# Patient Record
Sex: MALE | Race: WHITE
[De-identification: names, ages, dates, MRNs, and addresses within clinical notes are randomized per-mention and may not be internally consistent; named-entity substitution may affect disease eponyms.]

---

## 2019-04-17 ENCOUNTER — HOSPITAL ENCOUNTER (INPATIENT)
Dept: HOSPITAL 33 - ED | Age: 49
LOS: 9 days | Discharge: ANOTHER HOSPITAL | DRG: 872 | End: 2019-04-26
Attending: GENERAL PRACTICE | Admitting: GENERAL PRACTICE
Payer: COMMERCIAL

## 2019-04-17 DIAGNOSIS — E11.621: ICD-10-CM

## 2019-04-17 DIAGNOSIS — R94.4: ICD-10-CM

## 2019-04-17 DIAGNOSIS — M86.171: ICD-10-CM

## 2019-04-17 DIAGNOSIS — D72.829: ICD-10-CM

## 2019-04-17 DIAGNOSIS — A41.9: Primary | ICD-10-CM

## 2019-04-17 DIAGNOSIS — L02.611: ICD-10-CM

## 2019-04-17 DIAGNOSIS — E11.610: ICD-10-CM

## 2019-04-17 DIAGNOSIS — R60.9: ICD-10-CM

## 2019-04-17 DIAGNOSIS — L97.519: ICD-10-CM

## 2019-04-17 DIAGNOSIS — I10: ICD-10-CM

## 2019-04-17 DIAGNOSIS — N17.9: ICD-10-CM

## 2019-04-17 LAB
ALBUMIN SERPL-MCNC: 3.1 G/DL (ref 3.5–5)
ALP SERPL-CCNC: 119 U/L (ref 38–126)
ALT SERPL-CCNC: 20 U/L (ref 0–50)
ANION GAP SERPL CALC-SCNC: 14.2 MEQ/L (ref 5–15)
AST SERPL QL: 22 U/L (ref 17–59)
BILIRUB BLD-MCNC: 1.1 MG/DL (ref 0.2–1.3)
BUN SERPL-MCNC: 37 MG/DL (ref 9–20)
CALCIUM SPEC-MCNC: 8.7 MG/DL (ref 8.4–10.2)
CHLORIDE SERPL-SCNC: 108 MMOL/L (ref 98–107)
CO2 SERPL-SCNC: 17 MMOL/L (ref 22–30)
CREAT SERPL-MCNC: 2.59 MG/DL (ref 0.66–1.25)
GLUCOSE SERPL-MCNC: 242 MG/DL (ref 74–106)
POTASSIUM SERPLBLD-SCNC: 4.2 MMOL/L (ref 3.5–5.1)
PROT SERPL-MCNC: 7 G/DL (ref 6.3–8.2)
SODIUM SERPL-SCNC: 135 MMOL/L (ref 137–145)

## 2019-04-17 PROCEDURE — 76937 US GUIDE VASCULAR ACCESS: CPT

## 2019-04-17 PROCEDURE — 96367 TX/PROPH/DG ADDL SEQ IV INF: CPT

## 2019-04-17 PROCEDURE — 77001 FLUOROGUIDE FOR VEIN DEVICE: CPT

## 2019-04-17 PROCEDURE — 96374 THER/PROPH/DIAG INJ IV PUSH: CPT

## 2019-04-17 PROCEDURE — 86334 IMMUNOFIX E-PHORESIS SERUM: CPT

## 2019-04-17 PROCEDURE — 86255 FLUORESCENT ANTIBODY SCREEN: CPT

## 2019-04-17 PROCEDURE — 73718 MRI LOWER EXTREMITY W/O DYE: CPT

## 2019-04-17 PROCEDURE — 93268 ECG RECORD/REVIEW: CPT

## 2019-04-17 PROCEDURE — 82570 ASSAY OF URINE CREATININE: CPT

## 2019-04-17 PROCEDURE — 85610 PROTHROMBIN TIME: CPT

## 2019-04-17 PROCEDURE — 86160 COMPLEMENT ANTIGEN: CPT

## 2019-04-17 PROCEDURE — 99285 EMERGENCY DEPT VISIT HI MDM: CPT

## 2019-04-17 PROCEDURE — 83520 IMMUNOASSAY QUANT NOS NONAB: CPT

## 2019-04-17 PROCEDURE — 93306 TTE W/DOPPLER COMPLETE: CPT

## 2019-04-17 PROCEDURE — 76770 US EXAM ABDO BACK WALL COMP: CPT

## 2019-04-17 PROCEDURE — 82962 GLUCOSE BLOOD TEST: CPT

## 2019-04-17 PROCEDURE — 80202 ASSAY OF VANCOMYCIN: CPT

## 2019-04-17 PROCEDURE — 85025 COMPLETE CBC W/AUTO DIFF WBC: CPT

## 2019-04-17 PROCEDURE — 87186 SC STD MICRODIL/AGAR DIL: CPT

## 2019-04-17 PROCEDURE — 87086 URINE CULTURE/COLONY COUNT: CPT

## 2019-04-17 PROCEDURE — 83605 ASSAY OF LACTIC ACID: CPT

## 2019-04-17 PROCEDURE — 36415 COLL VENOUS BLD VENIPUNCTURE: CPT

## 2019-04-17 PROCEDURE — 85730 THROMBOPLASTIN TIME PARTIAL: CPT

## 2019-04-17 PROCEDURE — 80053 COMPREHEN METABOLIC PANEL: CPT

## 2019-04-17 PROCEDURE — 84156 ASSAY OF PROTEIN URINE: CPT

## 2019-04-17 PROCEDURE — 84165 PROTEIN E-PHORESIS SERUM: CPT

## 2019-04-17 PROCEDURE — 87205 SMEAR GRAM STAIN: CPT

## 2019-04-17 PROCEDURE — 36000 PLACE NEEDLE IN VEIN: CPT

## 2019-04-17 PROCEDURE — 87077 CULTURE AEROBIC IDENTIFY: CPT

## 2019-04-17 PROCEDURE — 86038 ANTINUCLEAR ANTIBODIES: CPT

## 2019-04-17 PROCEDURE — 87040 BLOOD CULTURE FOR BACTERIA: CPT

## 2019-04-17 PROCEDURE — 36569 INSJ PICC 5 YR+ W/O IMAGING: CPT

## 2019-04-17 PROCEDURE — 96365 THER/PROPH/DIAG IV INF INIT: CPT

## 2019-04-17 PROCEDURE — 96375 TX/PRO/DX INJ NEW DRUG ADDON: CPT

## 2019-04-17 PROCEDURE — 71045 X-RAY EXAM CHEST 1 VIEW: CPT

## 2019-04-17 PROCEDURE — 87070 CULTURE OTHR SPECIMN AEROBIC: CPT

## 2019-04-17 PROCEDURE — 83036 HEMOGLOBIN GLYCOSYLATED A1C: CPT

## 2019-04-17 PROCEDURE — 73630 X-RAY EXAM OF FOOT: CPT

## 2019-04-17 PROCEDURE — 86256 FLUORESCENT ANTIBODY TITER: CPT

## 2019-04-17 PROCEDURE — 81001 URINALYSIS AUTO W/SCOPE: CPT

## 2019-04-17 PROCEDURE — 80048 BASIC METABOLIC PNL TOTAL CA: CPT

## 2019-04-17 PROCEDURE — G0378 HOSPITAL OBSERVATION PER HR: HCPCS

## 2019-04-17 PROCEDURE — 83735 ASSAY OF MAGNESIUM: CPT

## 2019-04-17 PROCEDURE — 96360 HYDRATION IV INFUSION INIT: CPT

## 2019-04-17 RX ADMIN — GABAPENTIN PRN MG: 300 CAPSULE ORAL at 22:07

## 2019-04-17 RX ADMIN — CLONIDINE HYDROCHLORIDE SCH MG: 0.1 TABLET ORAL at 21:06

## 2019-04-17 RX ADMIN — INSULIN ASPART PRN UNIT: 100 INJECTION, SOLUTION INTRAVENOUS; SUBCUTANEOUS at 22:02

## 2019-04-17 NOTE — XRAY
Indication: Possible sepsis.



Comparison: August 12, 2010.



Portable apical lordotic chest is clear again with a few incidental tiny

calcified granulomas.  Heart and mediastinal structures within normal limits

for AP portable technique again with paratracheal calcified node.  Bony thorax

intact again with mild degenerative changes.



Impression: Nonacute chest with chronic features.

## 2019-04-17 NOTE — ERPHSYRPT
- History of Present Illness


Time Seen by Provider: 04/17/19 16:20


Source: patient


Exam Limitations: no limitations


Patient Subjective Stated Complaint: Pt states "I have been feeling bad with 

body aches and went to [facility] and they vidal blood and said I had a high 

white count and to get into the ED."


Triage Nursing Assessment: PT alert and oriented X 3, skin pwd PT ambulates 

with a limp.  Pt slightly tachypniec. PT has walking boot on his right foot.  

PT has wound noted to right foot.


Physician History: 





patient with fever and chills- seen in OP clinic- elevated wBC and neg flu sent 

to ED fro evaluation- hx of Charcot foot -right- has sore x 3 weeks- drainage; 

no recent trauma or travel; no sore throat; cough productive brown; no sob; no N

&V; no diarrhea; no known exposures


Timing/Duration: today, yesterday (onset), worse


Fever Severity: moderate


Fever Therapy PTA: Ibuprofen


Associated Symptoms: cough, diaphoresis, headache, muscle aches, No nausea/

vomiting, No rhinorrhea, No shortness of breath, No sore throat, No stiff neck, 

No syncope, No weakness


International travel in last 2 weeks: No


Allergies/Adverse Reactions: 








No Known Drug Allergies Allergy (Verified 04/17/19 16:30)


 





Home Medications: 








Amlodipine Besylate 10 mg PO DAILY 04/17/19 [History]


Fenofibrate Nanocrystallized [Fenofibrate] 48 mg PO DAILY 04/17/19 [History]


Gabapentin 600 mg PO DAILY 04/17/19 [History]


Insulin Glargine,Hum.rec.anlog [Lantus Solostar] 100 units IM DAILY 04/17/19 [

History]


Lisinopril 40 mg PO DAILY 04/17/19 [History]


cloNIDine HCl [Clonidine HCl] 0.1 mg PO DAILY 04/17/19 [History]





Hx Tetanus, Diphtheria Vaccination/Date Given: Yes


Hx Influenza Vaccination/Date Given: Yes


Hx Pneumococcal Vaccination/Date Given: No


Immunizations Up to Date: Yes





- Review of Systems


Constitutional: Chills


Eyes: No Symptoms


Ears, Nose, & Throat: No Symptoms


Respiratory: Cough, No Dyspnea, No Wheezing


Cardiac: No Chest Pain, No Palpitations, No Syncope


Abdominal/Gastrointestinal: No Abdominal Pain, No Nausea, No Vomiting, No 

Diarrhea


Genitourinary Symptoms: No Dysuria, No Frequency, No Hematuria, No Hesitancy


Musculoskeletal: Myalgias, No Back Pain, No Fall, No Injury, No Joint Pain


Skin: Other (open ulcer bottom lateral left foot)


Neurological: Headache, No Dizziness, No Focal Weakness, No Seizure, No Vertigo


Psychological: No Symptoms


Endocrine: No Symptoms


Hematologic/Lymphatic: No Symptoms


Immunological/Allergic: No Symptoms





- Past Medical History


Pertinent Past Medical History: Yes


Neurological History: No Pertinent History


ENT History: No Pertinent History


Cardiac History: Hypertension


Respiratory History: Pneumonia


Endocrine Medical History: Diabetes Type II


Musculoskeletal History: Other


GI Medical History: No Pertinent History


 History: No Pertinent History


Psycho-Social History: No Pertinent History


Male Reproductive Disorders: No Pertinent History





- Past Surgical History


Past Surgical History: No


Neuro Surgical History: No Pertinent History


Cardiac: No Pertinent History


Respiratory: No Pertinent History


Gastrointestinal: No Pertinent History


Genitourinary: No Pertinent History


Musculoskeletal: No Pertinent History


Male Surgical History: No Pertinent History





- Social History


Smoking Status: Never smoker


Exposure to second hand smoke: Yes


Alcohol Use: None


Drug Use: none


Patient Lives Alone: No


Significant Family History: no pertinent family hx





- Nursing Vital Signs


Nursing Vital Signs: 


 Initial Vital Signs











Temperature  101.8 F   04/17/19 16:21


 


Pulse Rate  114 H  04/17/19 16:21


 


Respiratory Rate  22   04/17/19 16:21


 


Blood Pressure  179/88   04/17/19 16:21


 


O2 Sat by Pulse Oximetry  94 L  04/17/19 16:21








 Pain Scale











Pain Intensity                 6

















- Physical Exam


General Appearance: moderate distress, alert, obese, other (ill appearing)


Eye Exam: PERRL/EOMI, eyes nml inspection, No photophobia


ENT Exam: normal ENT inspection, no apparent trauma, hearing grossly normal, 

TMs normal, pharynx normal, airway intact, No nasal congestion, No nasal 

drainage, No pharyngeal erythema, No tonsillar exudate, No trismus


Neck Exam: normal inspection, non-tender, supple, full range of motion, trachea 

midline, No JVD


Respiratory Exam: lungs clear, no respiratory distress, no accessory muscle use

, decreased breath sounds, decreased air movement, No normal breath sounds, No 

chest non-tender, No respiratory distress, No crackles/rales, No rhonchi, No 

stridor, No wheezing, No pleural rub


Cardiovascular/Chest Exam: normal heart sounds, regular rate/rhythm, normal 

peripheral pulses, tachycardia (114), No edema, No JVD


Gastrointestinal/Abdominal Exam: soft, non tender, no distention, no guarding, 

no organomegaly, normal bowel sounds, No distended, No guarding, No rebound, No 

hepatomegaly


Rectal Exam: deferred


Extremity Exam: non-tender, normal range of motion, normal capillary refill, no 

calf tenderness, no pedal edema, No normal inspection (left foot changes of 

Charcot foot with open ulcer bottom left heel- culture take), No calf tenderness

, No erica's sign


Neurologic Exam: alert, oriented x 3, cooperative, CNs II-XII nml as tested, 

normal mood/affect, nml cerebellar function, nml station & gait


Skin Exam: normal color, warm, dry, decubitus (post bottom left foot), No rash, 

No petechiae, No cyanosis


Lymphatic: No adenopathy


**SpO2 Interpretation**: borderline oxygenation


SpO2: 94


O2 Delivery: Room Air





- Course


Nursing assessment & vital signs reviewed: Yes





- Radiology Exams


  ** Chest


X-ray Interpretation: Reviewed by me, Teleradiologist Report, Negative, Other (

chronic changes only)


Ordered Tests: 


 Active Orders 24 hr











 Category Date Time Status


 


 Bedrest with BRP/BSC ROUTINE Activity  04/17/19 17:44 Ordered


 


 Accucheck ACHS Care  04/17/19 17:42 Ordered


 


 Accucheck STAT Care  04/17/19 16:28 Active


 


 Call Admit Doctor for Orders ON ADMISSION Care  04/17/19 17:43 Ordered


 


 Code Status Order ROUTINE Care  04/17/19 17:42 Ordered


 


 IV Care Q6H Care  04/17/19 17:42 Ordered


 


 IV Insertion STAT Care  04/17/19 16:28 Active


 


 Place in Observation ROUTINE Care  04/17/19 17:42 Ordered


 


 Pulse Oximetry (ED) STAT Care  04/17/19 16:28 Active


 


 Rectal Temperature STAT Care  04/17/19 16:28 Active


 


 Christopher Villarreal Apply ROUTINE Care  04/17/19 17:42 Ordered


 


 Weight,Daily 0600 Care  04/17/19 17:42 Ordered


 


 2000 Calorie ADA Diet  04/17/19 Dinner Ordered


 


 CHEST 1 VIEW (PORTABLE) Stat Exams  04/17/19 16:28 Completed


 


 BLOOD CULTURE Stat Lab  04/17/19 16:38 Received


 


 CMP Stat Lab  04/17/19 16:36 Completed


 


 CULTURE,WOUND Stat Lab  04/17/19 16:39 Received


 


 Lactic Acid Stat Lab  04/17/19 16:28 Results


 


 Urinalysis with Microscopy Stat Lab  04/17/19 Uncollected


 


 Transfer Order Routine Transfer  04/17/19 Ordered








Medication Summary











Generic Name Dose Route Start Last Admin





  Trade Name Freq  PRN Reason Stop Dose Admin


 


Lactated Ringer's  1,000 mls @ 999 mls/hr  04/17/19 16:30  04/17/19 17:17





  Lactated Ringers  IV  04/17/19 19:30  999 mls/hr





  .Q1H1M ANALISA   Administration





     





     





     





     


 


Azithromycin  500 mg in 250 mls @ 250 mls/hr  04/17/19 17:41  





  Zithromax 500 Mg/ 250 Ml Nacl Premix  IV  04/17/19 18:40  





  STAT STA   





     





     





     





     














Discontinued Medications














Generic Name Dose Route Start Last Admin





  Trade Name Freq  PRN Reason Stop Dose Admin


 


Acetaminophen  650 mg  04/17/19 16:28  04/17/19 16:53





  Tylenol 325 Mg***  PO  04/17/19 16:29  650 mg





  STAT STA   Administration





     





     





     





     


 


Acetaminophen  Confirm  04/17/19 16:49  





  Tylenol 325 Mg***  Administered  04/17/19 16:50  





  Dose   





  650 mg   





  .ROUTE   





  .STK-MED ONE   





     





     





     





     


 


Ampicillin Sodium/Sulbactam Sodium  3 gm in 100 mls @ 200 mls/hr  04/17/19 16:

28  04/17/19 16:54





  Unasyn 3gm / Nacl 100ml  IV  04/17/19 16:57  200 mls/hr





  STAT STA   200 mls/hr





     Administration





     





     





     


 


Ampicillin Sodium/Sulbactam Sodium  Confirm  04/17/19 16:49  





  Unasyn 3gm / Nacl 100ml  Administered  04/17/19 16:50  





  Dose   





  3 gm in 100 mls @ ud   





  .ROUTE   





  .STK-MED ONE   





     





     





     





     











Lab/Rad Data: 


 Laboratory Result Diagrams





 04/17/19 16:36 





 Laboratory Results











  04/17/19 04/17/19 Range/Units





  16:36 16:28 


 


Sodium  135 L   (137-145)  mmol/L


 


Potassium  4.2   (3.5-5.1)  mmol/L


 


Chloride  108 H   ()  mmol/L


 


Carbon Dioxide  17 L   (22-30)  mmol/L


 


Anion Gap  14.2   (5-15)  MEQ/L


 


BUN  37 H   (9-20)  mg/dL


 


Creatinine  2.59 H   (0.66-1.25)  mg/dL


 


Estimated GFR  28.3   ML/MIN


 


Glucose  242 H   ()  mg/dL


 


Lactic Acid   2.0  (0.4-2.0)  


 


Calcium  8.7   (8.4-10.2)  mg/dL


 


Total Bilirubin  1.10   (0.2-1.3)  mg/dL


 


AST  22   (17-59)  U/L


 


ALT  20   (0-50)  U/L


 


Alkaline Phosphatase  119   ()  U/L


 


Serum Total Protein  7.0   (6.3-8.2)  g/dL


 


Albumin  3.1 L   (3.5-5.0)  g/dL








reviewed








- Progress


Progress: re-examined (after meds and IV fluids)


Progress Note: 





04/17/19 16:38


sent from op clinic with elevated WBC - flu test neg; will assume septic- treat 

fever- push fluids- start ATBS check lactate and get CXR


04/17/19 16:58


CXR unremarkable; WBC 26.5 left shift H?H 16/47; plt 262; lactate 2.0 will 

continue fluids' monitor and recheck


04/17/19 17:20


family at bedside; no changes on exam; renal function off bun/cr = 37/2.59; low 

Na 135; K= ok; no anion gap


04/17/19 17:39


Dr Pandey consulted and will place on OBS; patient and family notified


Discussed with : Dannie (consulted adn will place in OBS)


Will see patient in: hospital (observation)


Counseled pt/family regarding: lab results, diagnosis, need for follow-up, rad 

results





- Departure


Departure Disposition: Observation


Clinical Impression: 


 FUO (fever of unknown origin), elevated WBC 26.5, elevated lactate 2.0, Renal 

failure (ARF), acute on chronic, Foot ulcer





Condition: Fair


Critical Care Time: No


Referrals: 


KENIA HOLLEY [Primary Care Provider] - 


SUSHIL PANDEY [ACTIVE STAFF] -

## 2019-04-18 LAB
ANION GAP SERPL CALC-SCNC: 11.3 MEQ/L (ref 5–15)
ANISOCYTOSIS BLD QL: (no result)
BUN SERPL-MCNC: 36 MG/DL (ref 9–20)
CALCIUM SPEC-MCNC: 7.9 MG/DL (ref 8.4–10.2)
CELLS COUNTED: 100
CHLORIDE SERPL-SCNC: 106 MMOL/L (ref 98–107)
CO2 SERPL-SCNC: 21 MMOL/L (ref 22–30)
CREAT SERPL-MCNC: 2.51 MG/DL (ref 0.66–1.25)
GLUCOSE SERPL-MCNC: 236 MG/DL (ref 74–106)
GLUCOSE UR-MCNC: >=500 MG/DL
HCT VFR BLD AUTO: 41.9 % (ref 42–50)
HGB BLD-MCNC: 13.8 GM/DL (ref 12.5–18)
MANUAL DIF COMMENT BLD-IMP: (no result)
MCH RBC QN AUTO: 28.5 PG (ref 26–32)
MCHC RBC AUTO-ENTMCNC: 32.9 G/DL (ref 32–36)
NEUTS BAND # BLD MANUAL: 19 % (ref 0–2)
PLATELET # BLD AUTO: 228 K/MM3 (ref 150–450)
POIKILOCYTOSIS BLD QL SMEAR: (no result)
POTASSIUM SERPLBLD-SCNC: 4.3 MMOL/L (ref 3.5–5.1)
PROT UR STRIP-MCNC: >=500 MG/DL
RBC # BLD AUTO: 4.84 M/MM3 (ref 4.1–5.6)
RBC #/AREA URNS HPF: (no result) /HPF (ref 0–2)
SODIUM SERPL-SCNC: 134 MMOL/L (ref 137–145)
WBC # BLD AUTO: 22.8 K/MM3 (ref 4–10.5)
WBC #/AREA URNS HPF: (no result) /HPF (ref 0–5)

## 2019-04-18 RX ADMIN — MORPHINE SULFATE PRN MG: 2 INJECTION, SOLUTION INTRAMUSCULAR; INTRAVENOUS at 17:00

## 2019-04-18 RX ADMIN — MORPHINE SULFATE PRN MG: 2 INJECTION, SOLUTION INTRAMUSCULAR; INTRAVENOUS at 22:27

## 2019-04-18 RX ADMIN — DEXTROSE MONOHYDRATE SCH MLS/HR: 50 INJECTION, SOLUTION INTRAVENOUS at 12:16

## 2019-04-18 RX ADMIN — ACETAMINOPHEN PRN MG: 325 TABLET ORAL at 22:28

## 2019-04-18 RX ADMIN — INSULIN ASPART PRN UNIT: 100 INJECTION, SOLUTION INTRAVENOUS; SUBCUTANEOUS at 07:48

## 2019-04-18 RX ADMIN — MORPHINE SULFATE PRN MG: 2 INJECTION, SOLUTION INTRAMUSCULAR; INTRAVENOUS at 12:21

## 2019-04-18 RX ADMIN — CLONIDINE HYDROCHLORIDE SCH MG: 0.1 TABLET ORAL at 09:50

## 2019-04-18 RX ADMIN — ACETAMINOPHEN PRN MG: 325 TABLET ORAL at 07:48

## 2019-04-18 RX ADMIN — AMLODIPINE BESYLATE SCH MG: 5 TABLET ORAL at 09:49

## 2019-04-18 RX ADMIN — ENOXAPARIN SODIUM SCH MG: 30 INJECTION, SOLUTION INTRAVENOUS; SUBCUTANEOUS at 09:49

## 2019-04-18 RX ADMIN — INSULIN GLARGINE SCH UNIT: 100 INJECTION, SOLUTION SUBCUTANEOUS at 07:48

## 2019-04-18 RX ADMIN — DEXTROSE MONOHYDRATE SCH MLS/HR: 50 INJECTION, SOLUTION INTRAVENOUS at 01:25

## 2019-04-18 RX ADMIN — SODIUM CHLORIDE SCH MLS/HR: 9 INJECTION, SOLUTION INTRAVENOUS at 18:33

## 2019-04-18 RX ADMIN — CLONIDINE HYDROCHLORIDE SCH MG: 0.1 TABLET ORAL at 22:18

## 2019-04-18 RX ADMIN — ACETAMINOPHEN PRN MG: 325 TABLET ORAL at 14:49

## 2019-04-18 RX ADMIN — FENOFIBRATE SCH MG: 145 TABLET ORAL at 09:50

## 2019-04-18 RX ADMIN — ACETAMINOPHEN PRN MG: 325 TABLET ORAL at 01:20

## 2019-04-18 RX ADMIN — DEXTROSE MONOHYDRATE SCH MLS/HR: 50 INJECTION, SOLUTION INTRAVENOUS at 17:00

## 2019-04-18 RX ADMIN — MORPHINE SULFATE PRN MG: 2 INJECTION, SOLUTION INTRAMUSCULAR; INTRAVENOUS at 07:56

## 2019-04-18 RX ADMIN — DEXTROSE MONOHYDRATE SCH MLS/HR: 50 INJECTION, SOLUTION INTRAVENOUS at 06:48

## 2019-04-18 RX ADMIN — INSULIN GLARGINE SCH UNIT: 100 INJECTION, SOLUTION SUBCUTANEOUS at 22:19

## 2019-04-18 NOTE — HP
HISTORY OF PRESENT ILLNESS:  This is a 49 year-old patient without a physician in the 
local area. He usually sees Dr. Cesar Ryan in [facility]. He presented first to 
[facility] and then to the emergency department. He reports starting Wednesday night he 
started to have a fever and chills. He went to [facility] yesterday and was seen and had a 
STAT CBC drawn where his white blood cell count was elevated. He was then told to go to 
the emergency department for further evaluation and treatment. The patient reports he had 
a sore on his right foot for the past two weeks that he had seen Dr. Dave for 
starting last week and they are putting topical antibiotic on this b.i.d. He reports he is 
putting MediHoney and iodine on it. He is not on any oral antibiotics for that. The 
patient denies having any central lines, PICC lines or any kind of hardware. He does have 
a history of osteomyelitis of his right foot that was due to Staphylococcus aureus over a 
year ago.  He reports he had a cough for a week that was productive of sputum that is 
brown-gray in color. The patient denies any abdominal pain. No nausea or vomiting. Dr. Ryan manages his diabetes. 



REVIEW OF SYSTEMS:  He has had no dyspnea. No wheezing. No dysuria. No abdominal pain. His 
appetite has been good. Otherwise review of systems is negative. 



PAST MEDICAL HISTORY:  Hypertension, diabetes mellitus type 2, Charcot foot on the left, 
history of osteomyelitis. 



PAST SURGICAL HISTORY: He reports five years ago he had fifth digit of his left foot 
removed. He reports one year ago he had surgery on his right foot for infection. 



MEDICATIONS:  Please see the medication reconciliation list which I have reviewed. 



ALLERGIES:  NKDA. 



SOCIAL HISTORY:  No tobacco. No alcohol use. He lives with his mom and dad. He works at a 
place called [employer] at [employer]. 



FAMILY HISTORY:  His mother is living and is healthy. His father is living and has 
diabetes. 



PHYSICAL EXAMINATION: 

VITAL SIGNS:  Temperature current 101.5F, temperature max 101.5F, heart rate 102 to 111, 
respiratory rate 18, blood pressure 146 to 184 over 73 to 85, weight 149.3 kg.  Oxygen 
saturation 91 to 94% on room air. 

GENERAL: The patient is a pleasant man lying in bed in no acute distress. 

CVS: He has a regular rate and rhythm. No murmurs, gallops or rubs. 

CHEST: Clear to auscultation bilaterally. No crackles or wheezes. 

ABDOMEN: Obese, soft, nontender, nondistended with normal bowel sounds. 

EXTREMITIES: His right foot has a deformity, some redness and swelling around his toes. On 
the lateral aspect he has an open 3 x 3 cm ulcer that is draining some serosanguinous 
fluid. He denies any tenderness of his foot. 

 

LABORATORY DATA AND TESTS: His white blood cell count is 22,800 with 70% neutrophils, 19% 
bands. Sodium 134, creatinine 2.5, glucose 236.  UA 6 to 10 white blood cells, greater 
than 500 glucose. Blood cultures were also growing gram-positive cocci. He has a wound 
culture pending. I added a urine culture today to the urine he had done in the emergency 
room. 



ASSESSMENT AND PLAN:  

1) SEPSIS:  He was started on Zosyn and Vancomycin with family to help dose yesterday. His 
blood pressure is stable. Will continue Tylenol as needed for fever. Continue with 
telemetry. Will ask PT to see him for his wound. I will order sputum culture as well. He 
is on IV fluids at a rate of 120 ml/hour. I plan to recheck blood work in the morning and 
continue with close monitoring. 

2) DIABETES MELLITUS TYPE 2: Will continue with insulin. His hemoglobin A1C was 6.98 on 
admission.  He may be having some hyperglycemia at this time due to the infection. 

3) DIABETIC FOOT ULCER: Will ask PT to see him for evaluation and treatment and have an 
x-ray of his right foot to try to look for possible osteomyelitis. 

4) HYPERTENSION: Will continue with home antihypertensive.

## 2019-04-18 NOTE — XRAY
Indication: Ulcer.  Surgery 1 year ago for infection.



Comparison: None



3 nonweightbearing views demonstrates osteopenia and marked chronic appearing

deformity mid to hindfoot including ankle joint.  3rd-5th metatarsals and

multiple tarsal bones are absent with multiple round radiopacities presumed

postoperative.  Diffuse soft tissue swelling/edema and scattered vascular

calcifications.  No acute fracture, suspicious bony lesions or osseous

destructive process.

## 2019-04-19 LAB
ANION GAP SERPL CALC-SCNC: 11.2 MEQ/L (ref 5–15)
BUN SERPL-MCNC: 37 MG/DL (ref 9–20)
CALCIUM SPEC-MCNC: 7.5 MG/DL (ref 8.4–10.2)
CELLS COUNTED: 100
CHLORIDE SERPL-SCNC: 108 MMOL/L (ref 98–107)
CO2 SERPL-SCNC: 19 MMOL/L (ref 22–30)
CREAT SERPL-MCNC: 3.01 MG/DL (ref 0.66–1.25)
GLUCOSE SERPL-MCNC: 156 MG/DL (ref 74–106)
HCT VFR BLD AUTO: 37.9 % (ref 42–50)
HGB BLD-MCNC: 12.2 GM/DL (ref 12.5–18)
MANUAL DIF COMMENT BLD-IMP: NORMAL
MCH RBC QN AUTO: 28.7 PG (ref 26–32)
MCHC RBC AUTO-ENTMCNC: 32.2 G/DL (ref 32–36)
NEUTS BAND # BLD MANUAL: 8 % (ref 0–2)
PLATELET # BLD AUTO: 224 K/MM3 (ref 150–450)
POTASSIUM SERPLBLD-SCNC: 4 MMOL/L (ref 3.5–5.1)
RBC # BLD AUTO: 4.25 M/MM3 (ref 4.1–5.6)
SODIUM SERPL-SCNC: 135 MMOL/L (ref 137–145)
WBC # BLD AUTO: 13.9 K/MM3 (ref 4–10.5)

## 2019-04-19 RX ADMIN — MORPHINE SULFATE PRN MG: 2 INJECTION, SOLUTION INTRAMUSCULAR; INTRAVENOUS at 20:38

## 2019-04-19 RX ADMIN — DEXTROSE MONOHYDRATE SCH MLS/HR: 50 INJECTION, SOLUTION INTRAVENOUS at 17:41

## 2019-04-19 RX ADMIN — SODIUM CHLORIDE SCH MLS/HR: 9 INJECTION, SOLUTION INTRAVENOUS at 13:09

## 2019-04-19 RX ADMIN — DEXTROSE MONOHYDRATE SCH MLS/HR: 50 INJECTION, SOLUTION INTRAVENOUS at 03:03

## 2019-04-19 RX ADMIN — DEXTROSE MONOHYDRATE SCH MLS/HR: 50 INJECTION, SOLUTION INTRAVENOUS at 12:28

## 2019-04-19 RX ADMIN — MORPHINE SULFATE PRN MG: 2 INJECTION, SOLUTION INTRAMUSCULAR; INTRAVENOUS at 04:17

## 2019-04-19 RX ADMIN — MORPHINE SULFATE PRN MG: 2 INJECTION, SOLUTION INTRAMUSCULAR; INTRAVENOUS at 09:53

## 2019-04-19 RX ADMIN — INSULIN GLARGINE SCH UNIT: 100 INJECTION, SOLUTION SUBCUTANEOUS at 08:22

## 2019-04-19 RX ADMIN — INSULIN ASPART PRN UNIT: 100 INJECTION, SOLUTION INTRAVENOUS; SUBCUTANEOUS at 20:46

## 2019-04-19 RX ADMIN — DEXTROSE MONOHYDRATE SCH MLS/HR: 50 INJECTION, SOLUTION INTRAVENOUS at 07:28

## 2019-04-19 RX ADMIN — AMLODIPINE BESYLATE SCH MG: 5 TABLET ORAL at 09:45

## 2019-04-19 RX ADMIN — CLONIDINE HYDROCHLORIDE SCH MG: 0.1 TABLET ORAL at 09:46

## 2019-04-19 RX ADMIN — GABAPENTIN PRN MG: 300 CAPSULE ORAL at 23:59

## 2019-04-19 RX ADMIN — ACETAMINOPHEN PRN MG: 325 TABLET ORAL at 03:11

## 2019-04-19 RX ADMIN — MORPHINE SULFATE PRN MG: 2 INJECTION, SOLUTION INTRAMUSCULAR; INTRAVENOUS at 14:21

## 2019-04-19 RX ADMIN — ENOXAPARIN SODIUM SCH MG: 30 INJECTION, SOLUTION INTRAVENOUS; SUBCUTANEOUS at 09:45

## 2019-04-19 RX ADMIN — CLONIDINE HYDROCHLORIDE SCH MG: 0.1 TABLET ORAL at 20:45

## 2019-04-19 RX ADMIN — INSULIN GLARGINE SCH UNIT: 100 INJECTION, SOLUTION SUBCUTANEOUS at 20:45

## 2019-04-19 RX ADMIN — FENOFIBRATE SCH MG: 145 TABLET ORAL at 09:45

## 2019-04-19 RX ADMIN — ACETAMINOPHEN PRN MG: 500 TABLET ORAL at 17:41

## 2019-04-19 NOTE — PCM.NOTE
Date and Time: 04/19/19 0834





Subjective Assessment: 





Patient reports his fever broke this AM and he feels better. He reports his 

appetite has been ok.  He reports he always has pain in his right foot.  





- Review of Systems


Constitutional: Fever


Eyes: No Symptoms


Ears, Nose, & Throat: No Symptoms


Respiratory: No Symptoms


Cardiac: No Symptoms


Abdominal/Gastrointestinal: No Symptoms


Genitourinary Symptoms: No Symptoms


Musculoskeletal: No Symptoms


Skin: No Symptoms





Objective Exam


General Appearance: no apparent distress, alert, obese


Neurologic Exam: alert, cooperative, normal mood/affect


Skin Exam: normal color, warm, dry, other (right foot dressed; obvious 

deformity (chronic))


Respiratory Exam: normal breath sounds, lungs clear, No crackles/rales, No 

rhonchi, No wheezing


Cardiovascular Exam: regular rate/rhythm, normal heart sounds, No murmur, No 

friction rub, No gallop


Gastrointestinal/Abdomen Exam: soft, normal bowel sounds, No tenderness, No 

distention





OBJECTIVE DATA


Vital Signs: 


 Vital Signs - 24 hr











  Temp Pulse Resp BP Pulse Ox


 


 04/19/19 07:08  99.9 F  94 H  21  136/66  92 L


 


 04/19/19 04:00  102.2 F  105 H  20  128/71  91 L


 


 04/19/19 00:23  102.9 F  117 H  20  132/68  94 L


 


 04/18/19 20:00  102.5 F  104 H  20  125/81  94 L


 


 04/18/19 16:31  101.2 F  105 H  22  148/75  97


 


 04/18/19 12:00  100.7 F  99 H  20  169/76  94 L








 Pain Assessment - Last Documented











Pain Intensity                 3


 


Pain Scale Used                0-10 Pain Scale











Intake and Output: 


 Intake & Output











 04/17/19 04/18/19 04/19/19 04/20/19





 06:59 06:59 06:59 06:59


 


Intake Total   4399 


 


Output Total  800 600 


 


Balance  -800 3799 


 


Weight  149.3 kg  











Lab Results: 


 Accuchecks











Date                           04/19/19


 


Date                           04/18/19


 


Date                           04/18/19


 


Time                           21:00


 


Time                           16:07


 


Time                           11:52


 


Accucheck Value:               200


 


Accucheck Value:               204


 


Accucheck Value:               207











 Lab Results-Last 24 Hours











  04/19/19 04/19/19 Range/Units





  05:48 05:48 


 


WBC  13.9 H   (4.0-10.5)  K/mm3


 


RBC  4.25   (4.1-5.6)  M/mm3


 


Hgb  12.2 L   (12.5-18.0)  gm/dl


 


Hct  37.9 L   (42-50)  %


 


MCV  89.2   ()  fl


 


MCH  28.7   (26-32)  pg


 


MCHC  32.2   (32-36)  g/dl


 


RDW  15.4 H   (11.5-14.0)  %


 


Plt Count  224   (150-450)  K/mm3


 


MPV  10.0 H   (6-9.5)  fl


 


Segmented Neutrophils  82 H   (36.-66.)  %


 


Band Neutrophils  8 H   (0.0-2.0)  %


 


Lymphocytes (Manual)  4 L   (24-44)  %


 


Monocytes (Manual)  4   (0.0-12.0)  %


 


Eosinophils (Manual)  2   (0.00-3.0)  %


 


Platelet Estimate  NORMAL   (NORMAL)  


 


RBC Morphology  NORMAL   


 


Sodium   135 L  (137-145)  mmol/L


 


Potassium   4.0  (3.5-5.1)  mmol/L


 


Chloride   108 H  ()  mmol/L


 


Carbon Dioxide   19 L  (22-30)  mmol/L


 


Anion Gap   11.2  (5-15)  MEQ/L


 


BUN   37 H  (9-20)  mg/dL


 


Creatinine   3.01 H  (0.66-1.25)  mg/dL


 


Estimated GFR   23.8  ML/MIN


 


Glucose   156 H  ()  mg/dL


 


Calcium   7.5 L  (8.4-10.2)  mg/dL











Radiology Exams: 


 Radiology Procedures











 Category Date Time Status


 


 CHEST 1 VIEW (PORTABLE) Stat Exams  04/17/19 16:28 Completed


 


 FOOT (MINIMUM 3 VIEWS) Urgent Exams  04/18/19 08:43 Completed


 


 MRI LOWER EXT JOINT W&WO CON [MRI] Routine Exams  04/19/19 Ordered














Assessment/Plan


(1) Sepsis


Current Visit: No   Status: Acute   


Assessment & Plan: 


WBC improving with IV zosyn and Vancomcyin.  Blood culture ID and sensitivity 

pending. Wound culture growing Staph aureus susceptible to Pencillin and 

vancomycin.  X-ray of foot did not who osteo but course of ulcer is fairly 

recent. Will check MRI to rule out osteomyelitis as this would affect how long 

a course of antibiotics he is on.  Will decrease IV fluids.  Continue close 

monitoring.








(2) Diabetic foot ulcer associated with type 2 diabetes mellitus


Current Visit: No   Status: Acute   


Assessment & Plan: 


Continue PT; also treatment as above for sepsis and he will need to follow up 

with Dr. Dave as an outpatient. We do not have a podiatrist that comes to 

our hospital. 


Code(s): E11.621 - TYPE 2 DIABETES MELLITUS WITH FOOT ULCER; L97.509 - NON-

PRESSURE CHRONIC ULCER OTH PRT UNSP FOOT W UNSP SEVERITY   





(3) Type II diabetes mellitus, well controlled


Current Visit: Yes   Status: Acute   


Assessment & Plan: 


Continue current insulin doses. 


Code(s): E11.9 - TYPE 2 DIABETES MELLITUS WITHOUT COMPLICATIONS   





(4) Renal failure (ARF), acute on chronic


Current Visit: Yes   Status: Acute   


Assessment & Plan: 


Will stop lisinopril.  Antibiotics are being dosed for his renal impairment.  

May need to see nephrologist if creatinine does not improve. 


Code(s): N17.9 - ACUTE KIDNEY FAILURE, UNSPECIFIED; N18.9 - CHRONIC KIDNEY 

DISEASE, UNSPECIFIED   





(5) Hypertension


Current Visit: Yes   Status: Acute   


Assessment & Plan: 


Currently well controlled. 


Code(s): I10 - ESSENTIAL (PRIMARY) HYPERTENSION   





(6) Charcot foot due to diabetes mellitus


Current Visit: Yes   Status: Acute   Code(s): E11.610 - TYPE 2 DIABETES 

MELLITUS W DIABETIC NEUROPATHIC ARTHROPATHY

## 2019-04-20 LAB
ANION GAP SERPL CALC-SCNC: 12.4 MEQ/L (ref 5–15)
BUN SERPL-MCNC: 37 MG/DL (ref 9–20)
CALCIUM SPEC-MCNC: 7.7 MG/DL (ref 8.4–10.2)
CELLS COUNTED: 100
CHLORIDE SERPL-SCNC: 108 MMOL/L (ref 98–107)
CO2 SERPL-SCNC: 18 MMOL/L (ref 22–30)
CREAT SERPL-MCNC: 3.39 MG/DL (ref 0.66–1.25)
GLUCOSE SERPL-MCNC: 166 MG/DL (ref 74–106)
HCT VFR BLD AUTO: 35.7 % (ref 42–50)
HGB BLD-MCNC: 11.3 GM/DL (ref 12.5–18)
MANUAL DIF COMMENT BLD-IMP: NORMAL
MCH RBC QN AUTO: 28.1 PG (ref 26–32)
MCHC RBC AUTO-ENTMCNC: 31.7 G/DL (ref 32–36)
NEUTS BAND # BLD MANUAL: 2 % (ref 0–2)
PLATELET # BLD AUTO: 242 K/MM3 (ref 150–450)
POTASSIUM SERPLBLD-SCNC: 3.8 MMOL/L (ref 3.5–5.1)
RBC # BLD AUTO: 4.02 M/MM3 (ref 4.1–5.6)
SODIUM SERPL-SCNC: 134 MMOL/L (ref 137–145)
TOXIC GRANULES BLD QL SMEAR: (no result)
WBC # BLD AUTO: 15.5 K/MM3 (ref 4–10.5)

## 2019-04-20 RX ADMIN — MORPHINE SULFATE PRN MG: 2 INJECTION, SOLUTION INTRAMUSCULAR; INTRAVENOUS at 09:11

## 2019-04-20 RX ADMIN — MORPHINE SULFATE PRN MG: 2 INJECTION, SOLUTION INTRAMUSCULAR; INTRAVENOUS at 20:48

## 2019-04-20 RX ADMIN — DEXTROSE MONOHYDRATE SCH MLS/HR: 50 INJECTION, SOLUTION INTRAVENOUS at 17:09

## 2019-04-20 RX ADMIN — CLONIDINE HYDROCHLORIDE SCH MG: 0.1 TABLET ORAL at 09:11

## 2019-04-20 RX ADMIN — AMLODIPINE BESYLATE SCH MG: 5 TABLET ORAL at 10:25

## 2019-04-20 RX ADMIN — DEXTROSE MONOHYDRATE SCH MLS/HR: 50 INJECTION, SOLUTION INTRAVENOUS at 06:47

## 2019-04-20 RX ADMIN — INSULIN GLARGINE SCH UNIT: 100 INJECTION, SOLUTION SUBCUTANEOUS at 08:55

## 2019-04-20 RX ADMIN — DEXTROSE MONOHYDRATE SCH MLS/HR: 50 INJECTION, SOLUTION INTRAVENOUS at 00:00

## 2019-04-20 RX ADMIN — DEXTROSE MONOHYDRATE SCH MLS/HR: 50 INJECTION, SOLUTION INTRAVENOUS at 12:40

## 2019-04-20 RX ADMIN — INSULIN ASPART PRN UNIT: 100 INJECTION, SOLUTION INTRAVENOUS; SUBCUTANEOUS at 17:09

## 2019-04-20 RX ADMIN — ACETAMINOPHEN PRN MG: 500 TABLET ORAL at 03:13

## 2019-04-20 RX ADMIN — INSULIN GLARGINE SCH UNIT: 100 INJECTION, SOLUTION SUBCUTANEOUS at 20:46

## 2019-04-20 RX ADMIN — ACETAMINOPHEN PRN MG: 500 TABLET ORAL at 20:21

## 2019-04-20 RX ADMIN — DEXTROSE MONOHYDRATE SCH MLS/HR: 50 INJECTION, SOLUTION INTRAVENOUS at 23:51

## 2019-04-20 RX ADMIN — CLONIDINE HYDROCHLORIDE SCH MG: 0.1 TABLET ORAL at 20:46

## 2019-04-20 RX ADMIN — FENOFIBRATE SCH MG: 145 TABLET ORAL at 09:11

## 2019-04-20 RX ADMIN — GABAPENTIN PRN MG: 300 CAPSULE ORAL at 20:21

## 2019-04-20 RX ADMIN — SODIUM CHLORIDE SCH MLS/HR: 9 INJECTION, SOLUTION INTRAVENOUS at 13:33

## 2019-04-20 RX ADMIN — ENOXAPARIN SODIUM SCH MG: 30 INJECTION, SOLUTION INTRAVENOUS; SUBCUTANEOUS at 09:11

## 2019-04-20 NOTE — XRAY
Indication: Pain and swelling.



Comparison: April 18, 2019.



3 nonweightbearing views of the right foot unchanged again demonstrating

diffuse soft tissue swelling/edema, osteopenia, marked chronic bony

deformities, surgical resection 3rd-5th metatarsals/tarsal bones, multiple

round iatrogenic radiopacities, and scattered vascular calcifications.  No

new/acute findings.



Comment: Preliminary interpretation was made by [facility].  No discrepancy.

## 2019-04-21 LAB
ANION GAP SERPL CALC-SCNC: 12.6 MEQ/L (ref 5–15)
BUN SERPL-MCNC: 39 MG/DL (ref 9–20)
CALCIUM SPEC-MCNC: 8.1 MG/DL (ref 8.4–10.2)
CELLS COUNTED: 100
CHLORIDE SERPL-SCNC: 107 MMOL/L (ref 98–107)
CO2 SERPL-SCNC: 20 MMOL/L (ref 22–30)
CREAT SERPL-MCNC: 3.58 MG/DL (ref 0.66–1.25)
GLUCOSE SERPL-MCNC: 124 MG/DL (ref 74–106)
HCT VFR BLD AUTO: 36.2 % (ref 42–50)
HGB BLD-MCNC: 11.7 GM/DL (ref 12.5–18)
MANUAL DIF COMMENT BLD-IMP: (no result)
MCH RBC QN AUTO: 28.3 PG (ref 26–32)
MCHC RBC AUTO-ENTMCNC: 32.3 G/DL (ref 32–36)
NEUTS BAND # BLD MANUAL: 1 % (ref 0–2)
PLATELET # BLD AUTO: 268 K/MM3 (ref 150–450)
POLYCHROMASIA BLD QL SMEAR: (no result)
POTASSIUM SERPLBLD-SCNC: 3.8 MMOL/L (ref 3.5–5.1)
RBC # BLD AUTO: 4.12 M/MM3 (ref 4.1–5.6)
SODIUM SERPL-SCNC: 136 MMOL/L (ref 137–145)
TOXIC GRANULES BLD QL SMEAR: (no result)
WBC # BLD AUTO: 16.7 K/MM3 (ref 4–10.5)

## 2019-04-21 RX ADMIN — DEXTROSE MONOHYDRATE SCH MLS/HR: 50 INJECTION, SOLUTION INTRAVENOUS at 05:52

## 2019-04-21 RX ADMIN — INSULIN GLARGINE SCH UNIT: 100 INJECTION, SOLUTION SUBCUTANEOUS at 08:12

## 2019-04-21 RX ADMIN — FENOFIBRATE SCH MG: 145 TABLET ORAL at 09:26

## 2019-04-21 RX ADMIN — DEXTROSE MONOHYDRATE SCH MLS/HR: 50 INJECTION, SOLUTION INTRAVENOUS at 17:35

## 2019-04-21 RX ADMIN — GABAPENTIN PRN MG: 300 CAPSULE ORAL at 12:24

## 2019-04-21 RX ADMIN — DEXTROSE MONOHYDRATE SCH MLS/HR: 50 INJECTION, SOLUTION INTRAVENOUS at 12:17

## 2019-04-21 RX ADMIN — ACETAMINOPHEN PRN MG: 500 TABLET ORAL at 12:24

## 2019-04-21 RX ADMIN — INSULIN GLARGINE SCH UNIT: 100 INJECTION, SOLUTION SUBCUTANEOUS at 21:47

## 2019-04-21 RX ADMIN — CLONIDINE HYDROCHLORIDE SCH MG: 0.1 TABLET ORAL at 09:27

## 2019-04-21 RX ADMIN — INSULIN ASPART PRN UNIT: 100 INJECTION, SOLUTION INTRAVENOUS; SUBCUTANEOUS at 20:37

## 2019-04-21 RX ADMIN — CLONIDINE HYDROCHLORIDE SCH MG: 0.1 TABLET ORAL at 21:47

## 2019-04-21 RX ADMIN — ENOXAPARIN SODIUM SCH MG: 30 INJECTION, SOLUTION INTRAVENOUS; SUBCUTANEOUS at 09:27

## 2019-04-21 RX ADMIN — SODIUM CHLORIDE SCH MLS/HR: 9 INJECTION, SOLUTION INTRAVENOUS at 13:43

## 2019-04-21 RX ADMIN — AMLODIPINE BESYLATE SCH MG: 5 TABLET ORAL at 09:27

## 2019-04-21 RX ADMIN — MORPHINE SULFATE PRN MG: 2 INJECTION, SOLUTION INTRAMUSCULAR; INTRAVENOUS at 21:53

## 2019-04-22 LAB
ANION GAP SERPL CALC-SCNC: 12.9 MEQ/L (ref 5–15)
BASOPHILS # BLD AUTO: 0.06 10*3/UL (ref 0–0.4)
BASOPHILS NFR BLD AUTO: 0.4 % (ref 0–0.4)
BUN SERPL-MCNC: 37 MG/DL (ref 9–20)
CALCIUM SPEC-MCNC: 8 MG/DL (ref 8.4–10.2)
CELLS COUNTED: 100
CHLORIDE SERPL-SCNC: 108 MMOL/L (ref 98–107)
CO2 SERPL-SCNC: 18 MMOL/L (ref 22–30)
CREAT SERPL-MCNC: 3.55 MG/DL (ref 0.66–1.25)
EOSINOPHIL # BLD AUTO: 0.55 10*3/UL (ref 0–0.5)
GLUCOSE SERPL-MCNC: 165 MG/DL (ref 74–106)
GLUCOSE UR-MCNC: 150 MG/DL
GRANULOCYTES # BLD AUTO: 12.64 10*3/UL (ref 1.4–6.9)
HCT VFR BLD AUTO: 38.1 % (ref 42–50)
HGB BLD-MCNC: 12.2 GM/DL (ref 12.5–18)
LYMPHOCYTES # SPEC AUTO: 1.48 10*3/UL (ref 1–4.6)
MANUAL DIF COMMENT BLD-IMP: NORMAL
MCH RBC QN AUTO: 28 PG (ref 26–32)
MCHC RBC AUTO-ENTMCNC: 32 G/DL (ref 32–36)
MONOCYTES # BLD AUTO: 2.04 10*3/UL (ref 0–1.3)
NEUTROPHILS NFR BLD AUTO: 75.3 % (ref 36–66)
NEUTS BAND # BLD MANUAL: 1 % (ref 0–2)
PLATELET # BLD AUTO: 325 K/MM3 (ref 150–450)
POTASSIUM SERPLBLD-SCNC: 3.8 MMOL/L (ref 3.5–5.1)
PROT UR STRIP-MCNC: 100 MG/DL
RBC # BLD AUTO: 4.35 M/MM3 (ref 4.1–5.6)
RBC #/AREA URNS HPF: (no result) /HPF (ref 0–2)
SODIUM SERPL-SCNC: 135 MMOL/L (ref 137–145)
WBC # BLD AUTO: 16.8 K/MM3 (ref 4–10.5)
WBC #/AREA URNS HPF: (no result) /HPF (ref 0–5)

## 2019-04-22 RX ADMIN — POTASSIUM CHLORIDE SCH MEQ: 10 TABLET, EXTENDED RELEASE ORAL at 22:01

## 2019-04-22 RX ADMIN — DEXTROSE MONOHYDRATE SCH MLS/HR: 50 INJECTION, SOLUTION INTRAVENOUS at 05:40

## 2019-04-22 RX ADMIN — ENOXAPARIN SODIUM SCH MG: 30 INJECTION, SOLUTION INTRAVENOUS; SUBCUTANEOUS at 11:00

## 2019-04-22 RX ADMIN — DEXTROSE MONOHYDRATE SCH MLS/HR: 50 INJECTION, SOLUTION INTRAVENOUS at 11:52

## 2019-04-22 RX ADMIN — MORPHINE SULFATE PRN MG: 2 INJECTION, SOLUTION INTRAMUSCULAR; INTRAVENOUS at 14:58

## 2019-04-22 RX ADMIN — CLONIDINE HYDROCHLORIDE SCH MG: 0.1 TABLET ORAL at 11:00

## 2019-04-22 RX ADMIN — ACETAMINOPHEN PRN MG: 500 TABLET ORAL at 01:03

## 2019-04-22 RX ADMIN — FENOFIBRATE SCH MG: 145 TABLET ORAL at 10:59

## 2019-04-22 RX ADMIN — GABAPENTIN PRN MG: 300 CAPSULE ORAL at 22:01

## 2019-04-22 RX ADMIN — CLONIDINE HYDROCHLORIDE SCH MG: 0.1 TABLET ORAL at 22:02

## 2019-04-22 RX ADMIN — DEXTROSE MONOHYDRATE SCH MLS/HR: 50 INJECTION, SOLUTION INTRAVENOUS at 00:45

## 2019-04-22 RX ADMIN — INSULIN GLARGINE SCH UNIT: 100 INJECTION, SOLUTION SUBCUTANEOUS at 08:52

## 2019-04-22 RX ADMIN — AMLODIPINE BESYLATE SCH MG: 5 TABLET ORAL at 10:59

## 2019-04-22 RX ADMIN — INSULIN GLARGINE SCH UNIT: 100 INJECTION, SOLUTION SUBCUTANEOUS at 22:02

## 2019-04-22 RX ADMIN — ACETAMINOPHEN PRN MG: 500 TABLET ORAL at 22:02

## 2019-04-22 RX ADMIN — DEXTROSE MONOHYDRATE SCH MLS/HR: 50 INJECTION, SOLUTION INTRAVENOUS at 17:09

## 2019-04-22 NOTE — XRAY
Indication: Cellulitis.  Possible osteomyelitis.



Sagittal, coronal, and axial MRI right foot performed using T1, T2, and STIR

sequences.



Comparison: None



Marked deformity seen of the ankle and foot presumed chronic.  There is

diffuse soft tissue swelling/edema of the visualized lower leg, ankle, and

foot.  Subcutaneous loculated fluid collection seen in the mid to hind foot

anterior laterally measuring at least 10.7 x 6 x 2.5 cm concerning for

abscess.  Suspect additional microlobulated abscesses posterior to the ankle.

3rd-5th metatarsals and multiple tarsal bones are absent with multiple round

densities presumed postoperative.  Mid to proximal 2nd metatarsal demonstrates

cortical thinning and T2 edema signal concerning for osteomyelitis.  Lesser

patchy T2 edema signal seen of the remaining tarsal bones and calcaneus.



Impression:

1.  Diffuse lower leg, ankle, and foot soft tissue swelling/edema favoring

cellulitis.  Large abscesses seen in the anterolateral foot with

micro-abscesses posterior to the ankle.

2.  2nd metatarsal T2 edema signal with cortical thinning concerning for

osteomyelitis.  Additional T2 edema signal seen of the remaining tarsal bones

and calcaneus.

3.  Chronic-appearing ankle/foot deformity with postsurgical changes as

detailed.

## 2019-04-22 NOTE — PCM.NOTE
Date and Time: 04/22/19 0854





Subjective Assessment: 





Patient reports he had less fever over the weekend. His appetite has been ok. 

He continues to have pain in his right foot. He reports the dressing has not 

been changed and PT, Renea Kaiser, said it could wait over the weekend. He could 

not tolerate lying on his back Friday for a MRI of his right foot but he is 

willing to try again. He reported over the weekend, Dr. Dyson mentioned 

surgery to him. 





- Review of Systems


Constitutional: No Symptoms


Eyes: No Symptoms


Ears, Nose, & Throat: No Symptoms


Respiratory: No Symptoms


Cardiac: No Symptoms


Abdominal/Gastrointestinal: No Symptoms


Genitourinary Symptoms: No Symptoms


Musculoskeletal: Other (right foot pain; open sore of right foot; right foot 

defomity)





Objective Exam


General Appearance: no apparent distress, obese


Neurologic Exam: alert, cooperative, normal mood/affect


Skin Exam: normal color, warm, dry, other (open wound about 3 x 3 cm on lateral 

aspect of right foot; swelling and redness of entire right foot with underlying 

deformity.)


Respiratory Exam: normal breath sounds, lungs clear, No crackles/rales, No 

rhonchi, No wheezing


Cardiovascular Exam: regular rate/rhythm, normal heart sounds, No murmur, No 

friction rub, No gallop


Gastrointestinal/Abdomen Exam: soft, normal bowel sounds, No tenderness, No 

distention, No mass





OBJECTIVE DATA


Vital Signs: 


 Vital Signs - 24 hr











  Temp Pulse Resp BP Pulse Ox


 


 04/22/19 08:00  98.8 F  82  18  170/79  92 L


 


 04/22/19 03:56  99.8 F  90  20  139/66  93 L


 


 04/21/19 23:52  99.7 F  93 H  19  143/81  95


 


 04/21/19 19:45  98.5 F  80  20  174/81  97


 


 04/21/19 16:00  98.5 F  76  18  134/74  93 L


 


 04/21/19 12:00  99.3 F  79  18  148/86  92 L








 Pain Assessment - Last Documented











Pain Intensity                 6


 


Pain Scale Used                0-10 Pain Scale











Intake and Output: 


 Intake & Output











 04/20/19 04/21/19 04/22/19 04/23/19





 06:59 06:59 06:59 06:59


 


Intake Total 2499 7252 0850 


 


Output Total 400   


 


Balance 0823 4472 4948 


 


Weight 153.3 kg 152.5 kg 152.5 kg 











Lab Results: 


 Accuchecks











Date                           04/22/19


 


Date                           04/21/19


 


Date                           04/21/19


 


Date                           04/21/19


 


Time                           07:30


 


Time                           22:00


 


Time                           16:30


 


Time                           11:30


 


Accucheck Value:               143


 


Accucheck Value:               201


 


Accucheck Value:               171


 


Accucheck Value:               123











 Lab Results-Last 24 Hours











  04/21/19 04/22/19 04/22/19 Range/Units





  11:00 04:00 06:00 


 


WBC   16.8 H   (4.0-10.5)  K/mm3


 


RBC   4.35   (4.1-5.6)  M/mm3


 


Hgb   12.2 L   (12.5-18.0)  gm/dl


 


Hct   38.1 L   (42-50)  %


 


MCV   87.6   ()  fl


 


MCH   28.0   (26-32)  pg


 


MCHC   32.0   (32-36)  g/dl


 


RDW   15.4 H   (11.5-14.0)  %


 


Plt Count   325   (150-450)  K/mm3


 


MPV   9.4   (6-9.5)  fl


 


Gran %   75.3 H   (36.0-66.0)  %


 


Eos # (Auto)   0.55 H   (0-0.5)  


 


Absolute Lymphs (auto)   1.48   (1.0-4.6)  


 


Absolute Monos (auto)   2.04 H   (0.0-1.3)  


 


Lymphocytes %   8.8 L   (24.0-44.0)  %


 


Monocytes %   12.2 H   (0.0-12.0)  %


 


Eosinophils %   3.3   (0.00-5.0)  %


 


Basophils %   0.4   (0.0-0.4)  %


 


Absolute Granulocytes   12.64 H   (1.4-6.9)  


 


Segmented Neutrophils   84 H   (36.-66.)  %


 


Band Neutrophils   1   (0.0-2.0)  %


 


Lymphocytes (Manual)   7 L   (24-44)  %


 


Monocytes (Manual)   3   (0.0-12.0)  %


 


Eosinophils (Manual)   5 H   (0.00-3.0)  %


 


Basophils #   0.06   (0-0.4)  


 


Platelet Estimate   NORMAL   (NORMAL)  


 


RBC Morphology   NORMAL   


 


Sodium    135 L  (137-145)  mmol/L


 


Potassium    3.8  (3.5-5.1)  mmol/L


 


Chloride    108 H  ()  mmol/L


 


Carbon Dioxide    18 L  (22-30)  mmol/L


 


Anion Gap    12.9  (5-15)  MEQ/L


 


BUN    37 H  (9-20)  mg/dL


 


Creatinine    3.55 H  (0.66-1.25)  mg/dL


 


Estimated GFR    19.6  ML/MIN


 


Glucose    165 H  ()  mg/dL


 


Calcium    8.0 L  (8.4-10.2)  mg/dL


 


Vancomycin Trough  18.84    (10-20)  ug/mL











Radiology Exams: 


 Radiology Procedures











 Category Date Time Status


 


 FOOT (MINIMUM 3 VIEWS) Routine Exams  04/20/19 10:58 Completed


 


 MRI LOW EXT JOINT W/O CONTRAST [MRI] Routine Exams  04/22/19 08:27 Ordered














Assessment/Plan


(1) Sepsis


Current Visit: No   Status: Acute   


Assessment & Plan: 


Continue vancomycin and zosyn.  WBC continues to be elevated which concerns me 

for possible underlying osteomyelitis.  Will try for MRI again today; if unable 

then may need surgical consultation. Blood and wound cultures have grown MSSA. 

Clinically, his foot shows little improvement.








(2) Diabetic foot ulcer associated with type 2 diabetes mellitus


Current Visit: No   Status: Acute   


Assessment & Plan: 


Hgb A1C was fairly good and blood glucoses fairly well controlled at this time.


Code(s): E11.621 - TYPE 2 DIABETES MELLITUS WITH FOOT ULCER; L97.509 - NON-

PRESSURE CHRONIC ULCER OTH PRT UNSP FOOT W UNSP SEVERITY   





(3) Type II diabetes mellitus, well controlled


Current Visit: Yes   Status: Acute   Code(s): E11.9 - TYPE 2 DIABETES MELLITUS 

WITHOUT COMPLICATIONS   





(4) Renal failure (ARF), acute on chronic


Current Visit: Yes   Status: Acute   


Assessment & Plan: 


Nephrologist has been consulted and plans to see the patient today.  


Code(s): N17.9 - ACUTE KIDNEY FAILURE, UNSPECIFIED; N18.9 - CHRONIC KIDNEY 

DISEASE, UNSPECIFIED   





(5) Hypertension


Current Visit: Yes   Status: Acute   


Assessment & Plan: 


Continue current medication.


Code(s): I10 - ESSENTIAL (PRIMARY) HYPERTENSION   





(6) Charcot foot due to diabetes mellitus


Current Visit: Yes   Status: Acute   


Assessment & Plan: 


He sees Dr. Dave as an outpatient. 


Code(s): E11.610 - TYPE 2 DIABETES MELLITUS W DIABETIC NEUROPATHIC ARTHROPATHY

## 2019-04-23 LAB
ALBUMIN SERPL-MCNC: 2.5 G/DL (ref 3.5–5)
ALP SERPL-CCNC: 195 U/L (ref 38–126)
ALT SERPL-CCNC: 27 U/L (ref 0–50)
ANION GAP SERPL CALC-SCNC: 12.5 MEQ/L (ref 5–15)
AST SERPL QL: 36 U/L (ref 17–59)
BILIRUB BLD-MCNC: 1 MG/DL (ref 0.2–1.3)
BUN SERPL-MCNC: 36 MG/DL (ref 9–20)
CALCIUM SPEC-MCNC: 8.5 MG/DL (ref 8.4–10.2)
CELLS COUNTED: 100
CHLORIDE SERPL-SCNC: 109 MMOL/L (ref 98–107)
CO2 SERPL-SCNC: 22 MMOL/L (ref 22–30)
CREAT SERPL-MCNC: 3.61 MG/DL (ref 0.66–1.25)
GLUCOSE SERPL-MCNC: 94 MG/DL (ref 74–106)
HCT VFR BLD AUTO: 37.9 % (ref 42–50)
HGB BLD-MCNC: 12.3 GM/DL (ref 12.5–18)
MANUAL DIF COMMENT BLD-IMP: NORMAL
MCH RBC QN AUTO: 28.4 PG (ref 26–32)
MCHC RBC AUTO-ENTMCNC: 32.5 G/DL (ref 32–36)
NEUTS BAND # BLD MANUAL: 4 % (ref 0–2)
PLATELET # BLD AUTO: 416 K/MM3 (ref 150–450)
POTASSIUM SERPLBLD-SCNC: 3.9 MMOL/L (ref 3.5–5.1)
PROT SERPL-MCNC: 6.5 G/DL (ref 6.3–8.2)
RBC # BLD AUTO: 4.32 M/MM3 (ref 4.1–5.6)
SODIUM SERPL-SCNC: 139 MMOL/L (ref 137–145)
WBC # BLD AUTO: 18.6 K/MM3 (ref 4–10.5)

## 2019-04-23 PROCEDURE — 0HBMXZZ EXCISION OF RIGHT FOOT SKIN, EXTERNAL APPROACH: ICD-10-PCS | Performed by: SURGERY

## 2019-04-23 RX ADMIN — TORSEMIDE SCH MG: 20 TABLET ORAL at 21:46

## 2019-04-23 RX ADMIN — INSULIN ASPART PRN UNIT: 100 INJECTION, SOLUTION INTRAVENOUS; SUBCUTANEOUS at 21:46

## 2019-04-23 RX ADMIN — FENOFIBRATE SCH MG: 145 TABLET ORAL at 10:18

## 2019-04-23 RX ADMIN — CLONIDINE HYDROCHLORIDE SCH MG: 0.1 TABLET ORAL at 10:18

## 2019-04-23 RX ADMIN — CLONIDINE HYDROCHLORIDE SCH MG: 0.1 TABLET ORAL at 21:45

## 2019-04-23 RX ADMIN — GABAPENTIN PRN MG: 300 CAPSULE ORAL at 21:46

## 2019-04-23 RX ADMIN — POTASSIUM CHLORIDE SCH MEQ: 10 TABLET, EXTENDED RELEASE ORAL at 21:46

## 2019-04-23 RX ADMIN — CLONIDINE HYDROCHLORIDE SCH MG: 0.1 TABLET ORAL at 14:59

## 2019-04-23 RX ADMIN — DEXTROSE MONOHYDRATE SCH MLS/HR: 50 INJECTION, SOLUTION INTRAVENOUS at 17:37

## 2019-04-23 RX ADMIN — DEXTROSE MONOHYDRATE SCH MLS/HR: 50 INJECTION, SOLUTION INTRAVENOUS at 00:04

## 2019-04-23 RX ADMIN — INSULIN GLARGINE SCH: 100 INJECTION, SOLUTION SUBCUTANEOUS at 08:48

## 2019-04-23 RX ADMIN — ACETAMINOPHEN PRN MG: 500 TABLET ORAL at 08:47

## 2019-04-23 RX ADMIN — POTASSIUM CHLORIDE SCH MEQ: 10 TABLET, EXTENDED RELEASE ORAL at 10:18

## 2019-04-23 RX ADMIN — INSULIN GLARGINE SCH UNIT: 100 INJECTION, SOLUTION SUBCUTANEOUS at 21:47

## 2019-04-23 RX ADMIN — DEXTROSE MONOHYDRATE SCH MLS/HR: 50 INJECTION, SOLUTION INTRAVENOUS at 23:12

## 2019-04-23 RX ADMIN — DEXTROSE MONOHYDRATE SCH MLS/HR: 50 INJECTION, SOLUTION INTRAVENOUS at 11:44

## 2019-04-23 RX ADMIN — ENOXAPARIN SODIUM SCH: 30 INJECTION, SOLUTION INTRAVENOUS; SUBCUTANEOUS at 10:18

## 2019-04-23 RX ADMIN — DEXTROSE MONOHYDRATE SCH MLS/HR: 50 INJECTION, SOLUTION INTRAVENOUS at 06:13

## 2019-04-23 RX ADMIN — ACETAMINOPHEN PRN MG: 500 TABLET ORAL at 15:03

## 2019-04-23 NOTE — OP
SURGERY DATE/TIME:  04/23/2019  1340    



PREOPERATIVE DIAGNOSIS:  Right diabetic foot infection, history of surgery by Dr. Dave in the past, in need of drainage until Dr. Dave can continue further long 
term foot care. 



POSTOPERATIVE DIAGNOSIS:  Right diabetic foot infection, history of surgery by Dr. Dave in the past, in need of drainage until Dr. Dave can continue further long 
term foot care. 



PROCEDURE:  Drainage and debridement of a small amount of skin and subcutaneous fat right 
foot with irrigation, culture and packing. 



SURGEON:        Dr. Jefferson Frye.



ANESTHESIA:    General.



ESTIMATED BLOOD LOSS:    Minimal.    



INDICATIONS:  As noted above. Risks and benefits explained in detail and not limited to 
and consent obtained.

     

DESCRIPTION OF PROCEDURE AND FINDINGS:  The patient is taken to the operating room. Site 
had been confirmed. General anesthesia induced. After official time out and no 
disagreement with planned procedure, foot was prepped and draped in usual sterile fashion. 
Initially the patient was concerned a little bit more of anterior area. There was a slight 
fluctuant area here this was carefully incised anteriorly. There was only minimal 
purulence here. There is a much more fluctuant area on the lateral part of the foot. There 
were two areas that were extremely fluctuant. It was felt that these should be opened up. 
A nickel-sized area of skin was carefully excised along with some devitalized underlying 
subcutaneous fat opening up and draining some area of thin purulence. This was then 
repeated more posteriorly on the lateral aspect of the foot on the other extremely 
fluctuant site this resulted in slightly small portion of skin with some devitalized 
subcutaneous fat about quarter-sized area. Once this was accomplished it was felt that 
this was drained as well as could be accomplished. Culture had been taken of the fluid. 
Copious amount of irrigation irrigating clear as possible. The patient had a very good 
blood supply and had brisk ooze from the surrounding viable skin and subcu. There were no 
vessel to ligate.  Some pin point cautery was done and a small piece of Surgicel was 
placed on top of the skin. Adequate hemostasis. Otherwise the wounds were packed with 
normal saline wet to dry after copious amount of irrigation had been accomplished 
irrigating as clear as possible. Sterile dressing applied per OR staff. The patient was 
transferred to the recovery room in stable condition. There were no immediate 
complications. He has a difficult long term diabetic foot followed by Dr. Dave, 
Podiatrist. He is in need of long term care. He is ultimate risk of requiring amputation 
but he has quite a bit of blood supply at this point. Continue IV antibiotics until Dr. Dave can see him for further care for his wound. Local wound care until then.

## 2019-04-23 NOTE — PCM.NOTE
Date and Time: 04/23/19  0854





Subjective Assessment: 





Dr. Dave was paged yesterday but was unable to call back. I spoke with him 

personally today and in my conversation, he thought it would be best for 

patient to have I and D here.  He was willing to talk to patient and we tried 

to transfer his call to patient's room, but connection was not made.  Patient 

denies constipation.  He states if Dr. Dave thinks it is best to have 

procedure here, he is willing to talk with general surgeon.  Patient is NPO at 

this time for kidney ultrasound. Dr. Marrero, nephrologist, was able to see 

patient yesterday evening also. 





Objective Exam


General Appearance: no apparent distress, alert, obese, other (right foot 

wrapped)


Neurologic Exam: alert, cooperative, normal mood/affect


Skin Exam: other (+ erythema of right lower extremity.)


Respiratory Exam: normal breath sounds, lungs clear, No crackles/rales, No 

rhonchi, No wheezing


Cardiovascular Exam: regular rate/rhythm, normal heart sounds, No murmur, No 

friction rub, No gallop


Gastrointestinal/Abdomen Exam: soft, normal bowel sounds


Extremity Exam: other (swelling and marked deformity of right foot; dressing 

left intact this AM.)





OBJECTIVE DATA


Vital Signs: 


 Vital Signs - 24 hr











  Temp Pulse Resp BP Pulse Ox


 


 04/23/19 07:34  98.3 F  76  18  187/86  96


 


 04/23/19 04:00  98.6 F  76  20  158/75  94 L


 


 04/23/19 00:00  99.1 F  79  20  150/77  92 L


 


 04/22/19 20:00  99.8 F  85  21  185/81  96


 


 04/22/19 16:00  98.6 F  87  18  185/85  94 L


 


 04/22/19 12:00  98.7 F  82  18  185/83  97








 Pain Assessment - Last Documented











Pain Intensity                 7


 


Pain Scale Used                0-10 Pain Scale











Intake and Output: 


 Intake & Output











 04/21/19 04/22/19 04/23/19 04/24/19





 06:59 06:59 06:59 06:59


 


Intake Total 4472 4948 2400 0


 


Output Total   2100 


 


Balance 4472 4948 300 0


 


Weight 152.5 kg 152.5 kg  











Lab Results: 


 Accuchecks











Date                           04/22/19


 


Date                           04/22/19


 


Time                           16:30


 


Time                           11:30


 


Accucheck Value:               164


 


Accucheck Value:               185


 


Accucheck Value:               135











 Lab Results-Last 24 Hours











  04/22/19 04/22/19 04/22/19 Range/Units





  22:20 22:20 22:20 


 


WBC     (4.0-10.5)  K/mm3


 


RBC     (4.1-5.6)  M/mm3


 


Hgb     (12.5-18.0)  gm/dl


 


Hct     (42-50)  %


 


MCV     ()  fl


 


MCH     (26-32)  pg


 


MCHC     (32-36)  g/dl


 


RDW     (11.5-14.0)  %


 


Plt Count     (150-450)  K/mm3


 


MPV     (6-9.5)  fl


 


Segmented Neutrophils     (36.-66.)  %


 


Band Neutrophils     (0.0-2.0)  %


 


Lymphocytes (Manual)     (24-44)  %


 


Monocytes (Manual)     (0.0-12.0)  %


 


Eosinophils (Manual)     (0.00-3.0)  %


 


Platelet Estimate     (NORMAL)  


 


RBC Morphology     


 


Magnesium     (1.6-2.3)  mg/dL


 


Urine Color    YELLOW  (YELLOW)  


 


Urine Appearance    CLEAR  (CLEAR)  


 


Urine pH    6.0  (5-6)  


 


Ur Specific Gravity    1.010  (1.005-1.025)  


 


Urine Protein    100  (Negative)  


 


Urine Ketones    NEGATIVE  (NEGATIVE)  


 


Urine Blood    MODERATE  (0-5)  Fabian/ul


 


Urine Nitrite    NEGATIVE  (NEGATIVE)  


 


Urine Bilirubin    NEGATIVE  (NEGATIVE)  


 


Urine Urobilinogen    NEGATIVE  (0-1)  mg/dL


 


Ur Leukocyte Esterase    NEGATIVE  (NEGATIVE)  


 


Urine WBC (Auto)    3-5  (0-5)  /HPF


 


Urine RBC (Auto)    26-50  (0-2)  /HPF


 


U Epithel Cells (Auto)    NONE  (FEW)  /HPF


 


Urine Bacteria (Auto)    NONE  (NEGATIVE)  /HPF


 


Urine Yeast (Budding)    Few  (NEGATIVE)  /HPF


 


Ur Random Creatinine  38.5    MG/DL


 


U Random Total Protein   331 H   (0-12)  mg/dL


 


Urine Glucose    150  (NEGATIVE)  mg/dL














  04/23/19 04/23/19 Range/Units





  05:50 05:50 


 


WBC  18.6 H   (4.0-10.5)  K/mm3


 


RBC  4.32   (4.1-5.6)  M/mm3


 


Hgb  12.3 L   (12.5-18.0)  gm/dl


 


Hct  37.9 L   (42-50)  %


 


MCV  87.7   ()  fl


 


MCH  28.4   (26-32)  pg


 


MCHC  32.5   (32-36)  g/dl


 


RDW  15.2 H   (11.5-14.0)  %


 


Plt Count  416   (150-450)  K/mm3


 


MPV  9.4   (6-9.5)  fl


 


Segmented Neutrophils  69 H   (36.-66.)  %


 


Band Neutrophils  4 H   (0.0-2.0)  %


 


Lymphocytes (Manual)  15 L   (24-44)  %


 


Monocytes (Manual)  11   (0.0-12.0)  %


 


Eosinophils (Manual)  1   (0.00-3.0)  %


 


Platelet Estimate  NORMAL   (NORMAL)  


 


RBC Morphology  NORMAL   


 


Magnesium   1.8  (1.6-2.3)  mg/dL


 


Urine Color    (YELLOW)  


 


Urine Appearance    (CLEAR)  


 


Urine pH    (5-6)  


 


Ur Specific Gravity    (1.005-1.025)  


 


Urine Protein    (Negative)  


 


Urine Ketones    (NEGATIVE)  


 


Urine Blood    (0-5)  Fabian/ul


 


Urine Nitrite    (NEGATIVE)  


 


Urine Bilirubin    (NEGATIVE)  


 


Urine Urobilinogen    (0-1)  mg/dL


 


Ur Leukocyte Esterase    (NEGATIVE)  


 


Urine WBC (Auto)    (0-5)  /HPF


 


Urine RBC (Auto)    (0-2)  /HPF


 


U Epithel Cells (Auto)    (FEW)  /HPF


 


Urine Bacteria (Auto)    (NEGATIVE)  /HPF


 


Urine Yeast (Budding)    (NEGATIVE)  /HPF


 


Ur Random Creatinine    MG/DL


 


U Random Total Protein    (0-12)  mg/dL


 


Urine Glucose    (NEGATIVE)  mg/dL











Radiology Exams: 


 Radiology Procedures











 Category Date Time Status


 


 ECHO W/2D AND DOPPLER [US] Routine Exams  04/23/19 08:00 Ordered


 


 KIDNEY [US] Routine Exams  04/23/19 08:00 Ordered


 


 MRI LOWER EXT W/O CONTRAST [MRI] Routine Exams  04/22/19 08:27 Completed











Multi-Disciplinary Progress Notes: 


 Multi-Disciplinary Progress Notes





04/22/19 17:42 Physical Therapy Note by Renea Kaiser


SEE PICTURE DOCUMENTATION OF CHANGES IN RIGHT FOOT OVER THE WEEKEND IN HARD 

CHART. MRI + FOR ABSCESS/POSSIBLE OM THIS DATE.


PLANTAR FOOT WOUND BED APPEARS TO HAVE IMPROVED GRANULATION.





Initialized on 04/22/19 17:42 - END OF NOTE








04/22/19 14:10 Nutrition Note by Lorie Gregg


F/u Note:


2000 ADA diet with [id number] con't with 100% po intake.  Labs 4/22= Na 135, BUN 

37, Cr 3.55, glu 165, hgb 12.2, hct 38.1.  Adm weight 149.3 kg/current weight 

152.5. kg.  +fluid balance 4948 mls.  Goals met and ongoing.  Con't to 

recommend adding 2 gm Na restriction to diet order.  Will con't to monitor and f

/u prn.  TDAMION Rodgers





Initialized on 04/22/19 14:10 - END OF NOTE








04/22/19 09:29 Pharmacy Note by Romulo Frey


Creatinine still high at 3.55.  Will back down Vancomycin to 1.25gm dose.  

Culture shows regular staph (s) to all except Pen G.  Will repeat trough 

tomorrow.








 ** Electronically signed by Romulo Frey on 04/22/19 09:34 **


Initialized on 04/22/19 09:29 - END OF NOTE

















Assessment/Plan


(1) Sepsis


Current Visit: No   Status: Acute   


Assessment & Plan: 


Vancomycin discontinued yesterday as MSSA.  Continue zosyn but we may be able 

to decrease spectrum of coverage after I and D of right foot abscess.








(2) Diabetic foot ulcer associated with type 2 diabetes mellitus


Current Visit: No   Status: Acute   


Assessment & Plan: 


Continue care by PT; plan for I and D of abscess soon.


Code(s): E11.621 - TYPE 2 DIABETES MELLITUS WITH FOOT ULCER; L97.509 - NON-

PRESSURE CHRONIC ULCER OTH PRT UNSP FOOT W UNSP SEVERITY   





(3) Type II diabetes mellitus, well controlled


Current Visit: Yes   Status: Acute   Code(s): E11.9 - TYPE 2 DIABETES MELLITUS 

WITHOUT COMPLICATIONS   





(4) Renal failure (ARF), acute on chronic


Current Visit: Yes   Status: Acute   


Assessment & Plan: 


Dr. Marrero saw patient yesterday.  Check CMP this AM.


Code(s): N17.9 - ACUTE KIDNEY FAILURE, UNSPECIFIED; N18.9 - CHRONIC KIDNEY 

DISEASE, UNSPECIFIED   





(5) Hypertension


Current Visit: Yes   Status: Acute   


Assessment & Plan: 


His blood pressure continues to run high. Will adjust his antihypertensives.


Code(s): I10 - ESSENTIAL (PRIMARY) HYPERTENSION   





(6) Charcot foot due to diabetes mellitus


Current Visit: Yes   Status: Acute   Code(s): E11.610 - TYPE 2 DIABETES 

MELLITUS W DIABETIC NEUROPATHIC ARTHROPATHY   





(7) Foot abscess, right


Current Visit: Yes   Status: Acute   


Assessment & Plan: 


He has developed an abscess in his right foot from the cellulitis.  On exam 

yesterday, his foot looked markedly different than it had when I saw it on 

Friday.  His foot had been kept wrapped and dressed over the weekend when Dr. Dyson was rounding. 


Code(s): L02.611 - CUTANEOUS ABSCESS OF RIGHT FOOT

## 2019-04-23 NOTE — XRAY
Indication: Elevated creatinine.



Two-dimensional renal sonogram performed.



Comparison: February 18, 2016.



Again sonogram technically difficult due to body habitus.  Both kidneys normal

in reniform shape with normal color perfusion.  Right kidney measures 12.3 x

7.0 x 6.6 cm and the left measures 14.3 x 6.3 x 5.4 cm.  New 2.1 cm left upper

pole cortical cyst.  No solid renal mass, hydronephrosis, or perinephric

fluid.  Corticomedullary differentiation preserved without cortical thinning.

Images of the minimally distended urinary bladder grossly unremarkable.

Ureteral jets not seen within the allotted exam time.



Impression: New left renal cyst.  Remaining renal sonogram is negative.

## 2019-04-23 NOTE — ECHO
DATE OF PROCEDURE:   04/23/2019 



CLINICAL INFORMATION:  Possible infective endocarditis. 



The M-mode 2D, and Doppler echocardiogram were technically difficult due to the body 
habitus. There is normal contractility of the left ventricle. The left ventricle is mildly 
dilated with a dimension of 6.0 cm. The septal wall thickness is increased at 1.7 cm. The 
left ventricular posterior wall thickness is increased at 1.4 cm. There is accentuated 
contractility of the left ventricle with an ejection fraction of 70%. The right ventricle 
is grossly normal. There is no apical thrombus noted in the left ventricle. The left 
atrium is borderline dilated at 4.0 cm. The interatrial septum is intact. The right atrium 
is normal. The aortic valve is not well visualized. The mitral valve is grossly normal. 
There is mild tricuspid regurgitation. The pulmonic valve is not well visualized. The 
aortic root is normal at 3.1 cm. There is no pericardial effusion present.  



IMPRESSION:

1) A TECHNICALLY DIFFICULT ECHOCARDIOGRAM. 

2) NO DEFINITE VEGETATION WAS NOTED. 

3) ACCENTUATED CONTRACTILITY OF THE LEFT VENTRICLE.

4) MILD LEFT VENTRICLE DILATATION. 

5) MILD TO MODERATE CONCENTRIC LEFT VENTRICULAR HYPERTROPHY. 

6) MILD TRICUSPID REGURGITATION.

## 2019-04-23 NOTE — CONS
CONSULT DATE:  04/23/2019



HISTORY:  A 49 year-old diabetic patient of Dr. Pandey and Dr. Dave. Dr. Dave has 
done surgeries for Charcot joint. He had chronic changes, had some fever. He was admitted 
for IV antibiotics. He has been here for some time. He had a MRI that apparently had some 
question of abscess. They tried to get a hold of Dr. Dave who was tied up. He asked 
that we be consulted for possible drainage and that he would take over longer term care 
for the patient. 



PAST MEDICAL HISTORY:  Obesity. Hypertension. Diabetes mellitus type 2. Charcot foot. He 
has a history of osteomyelitis. History of methicillin resistant staphylococcus aureus in 
the past. 



PAST SURGICAL HISTORY: Surgery on his foot by Dr. Dave left foot in the past as well 
as the right foot he had a Charcot joint.   



MEDICATIONS:  It looks like he has been on some Lovenox, clonidine, insulin, potassium 
chloride, Torsemide, Zosyn here. His home medications as listed in the chart prior to 
that. 



ALLERGIES:  NKDA.



FAMILY HISTORY:  Diabetes. 



SOCIAL HISTORY:  No smoking or alcohol abuse.  



LAB DATA AND TESTS: White count 18,600. They said he has been in the hospital since last 
Thursday. The white count was 16,000 previously.  Hemoglobin A1C of 6.9 on admission. 



REVIEW OF SYSTEMS:  Twelve systems reviewed. Pertinent for as noted above per admission 
assessment and history and physical. No chest pain or palpitations. Other systems negative 
or noncontributory as above and per preadmission questionnaire. 



PHYSICAL EXAMINATION:  

GENERAL:  No acute distress.

HEENT:  Sclera nonicteric.

NECK:  No JVD.

CHEST:  Equal excursion, nonlabored breathing.

CVS:  Regular rhythm and pulse.

ABDOMEN: Soft, nondistended.  

EXTREMITIES: Deformity. There is a diabetic ulcer down more towards the heel that is clean 
and some mild tenderness on his foot. He has some changes anterior foot as well with 
Charcot appearing foot. Otherwise no cyanosis currently.

NEURO:  Alert, moving extremities symmetrically.  



IMPRESSION: Diabetic foot infection. MRI suggests question of abscess. Dr. Dave was 
unavailable at this moment and asked that I be consulted to do some possible drainage. Dr. Dave would resume longer term care with the patient. The patient understands general 
risk of bleeding or infection, risk of ongoing infection or sepsis. Ultimate overall risk 
of losing the foot or lower leg. General risk of anesthesia, deep venous thrombosis, 
pulmonary embolism, pneumonia but not limited to. He understands if he fails to improve 
may need to be transferred to Dr. Dave to be addressed in surgical fashion. He 
understands and agrees to the plan, will proceed with drainage, possible debridement in 
the OR when OR time available.

## 2019-04-23 NOTE — CONS
CONSULT DATE:  04/22/2019     



REASON FOR CONSULT:  Evaluation of increase in BUN and creatinine. 



HISTORY:  Enoch Diaz is a very pleasant 49 year-old gentleman. He presented to the 
emergency department with fever and chills. He had elevated white count. The patient had 
sore on his right foot for the past two weeks. He was being treated with topical 
antibiotics. He did not have any type central line, PICC line, hardware. He does have 
history of prior osteomyelitis with Staphylococcus aureus. The patient had blood cultures 
done which showed Staphylococcus aureus. The patient was started on Zosyn and Vancomycin. 
During the course of hospitalization the patient was noted to have a creatinine which was 
initially noted to be around 2.55 mg%. Eventually started to deteriorate and it went up to 
3.6 mg%. As a result renal consultation was called. 



The patient denies any nausea, vomiting or diarrhea. He was not hypotensive. He was not on 
any nonsteroidals. He did not have any dye exposure. 



His baseline creatinine is not entirely clear but he states that he has been told about 
kidney disease in the past but is unaware of his baseline glomerular filtration rate or 
creatinine. He states he had seen a nephrologist about a year ago in [place]. 
Otherwise appetite has been fair. He is drinking well. He is afebrile at this time. He 
denies any voiding issues. Leg swelling is present but it is not any more than usual. He 
denies any known history of heart failure.   



REVIEW OF SYSTEMS: Basically positive for right foot sore, fever at the time of admission, 
pain. He had some cough at admission which is better. 



No nausea, vomiting or diarrhea. No bleeding from any of the orifices.  No voiding issues. 
No focal weaknesses. No headache. No blurry vision.  All systems were reviewed in detail 
and pertinent mentioned here and in history of present illness and the rest were negative. 
No dysuria. 



PAST MEDICAL HISTORY: Hypertension. Chronic kidney disease not otherwise specified. 
Diabetes mellitus type 2. Charcot foot and possible neuropathy. History of prior 
osteomyelitis with Staph. 



PAST SURGICAL HISTORY:  Fifth digit of his left foot removed. He also had surgery on his 
right foot for infection. 



MEDICATIONS:  Inpatient medications were reviewed. Outpatient medications were reviewed. 
The patient was on Fenofibrate, clonidine, Norvasc, Tylenol, calcium, Vancomycin, Unasyn. 
Inpatient medications were reviewed details of those were noted. 



ALLERGIES:  NKDA.

 SOCIAL HISTORY:  No history of smoking, alcohol or illicit drug abuse. Lives with mother 
and father. He works at a place called [employer] at .  



FAMILY HISTORY:  Denies any history of renal problems in the family. 

 

PHYSICAL EXAMINATION:  Reveals a gentleman who is upright in bed in no major respiratory 
distress. Blood pressure was noted to be 139/66, pulse rate was 90/minute, respiratory 
rate 20/minute, temperature 98.8F.

HEENT: Normocephalic, atraumatic, pink conjunctivae, nonicteric sclera. 

NECK:  Supple. No JVD 

CHEST: Clear to auscultation. No distress. 

CVS:  S1, S2 normal. No rub or gallop. 

ABDOMEN:  Soft, globular, nontender.  

EXTREMITIES: No cyanosis or clubbing. +1 edema bilaterally.  There was deformity on the 
lateral aspect of right foot. There was 3 x 3 cm ulcer draining serosanguinous fluid. No 
tenderness at this time. 

SKIN: No diffuse rash seen. Skin turgor is normal. Skin over the lower extremities shows 
venous stasis changes. 

MUSCULOSKELETAL: No acute joint swelling or redness noted. 

NEUROLOGIC: Alert, awake, oriented x3. 



LAB DATA AND TESTS:  Labs were reviewed. Pertinent labs today hemoglobin 12.2, white count 
16.8. Creatinine 3.55, sodium 135, potassium 3.8. All of the tests were reviewed. 

 

ASSESSMENT:  

1) ACUTE KIDNEY INJURY AND CHRONIC KIDNEY DISEASE NOT OTHERWISE SPECIFIED: Etiology of 
acute kidney injury could be on two accounts. The patient could have Vancomycin-related 
nephrotoxicity. Vancomycin-related nephrotoxicity can be accumulated and/or idiosyncratic. 
At this time Staphylococcus aureus seems to be sensitive to penicillin. We have discussed 
with Dr. Pandey to discontinue Vancomycin. The second possibility could post-infectious 
limb. I would do baseline urine studies which include UA, urine protein quantification, 
also for postinfectious glomerulonephritis. We will do C3, C4, FIORDALIZA.  Also infection can 
cause precipitation of ANCA vasculitis will check ANCA levels. At this time the patient is 
nonoliguric. Potassium levels were okay. Bicarb levels were fine. No urgent need for renal 
replacement therapy, continue to monitor closely. The possibilities in future were 
discussed with the patient. At that time he may need transfer to [place].

2) HYPERTENSION: Fair. Off and on uncontrolled blood pressure. We will discontinue Norvasc 
given tendency for leg swelling which may further impede his healing process. The patient 
is on clonidine once a day. We will change clonidine to 0.1 t.i.d.  Further titration will 
then depend on blood pressure trend and acute scenario. Order the patient for ARB. 

3) FLUID RETENTION AND EDEMA: Could be due because of aggressive fluid repletion. The 
patient is also has avid fluid intake. We will discontinue IV fluids, start him on 
Torsemide which is a long-acting loop diuretic without lesser peaks and troughs, this 
should help with blood pressure, fluid overload and help with the healing process. 

4) LEUKOCYTOSIS:  Still having low grade fever on antibiotics. I would recommend 2D echo 
to make sure there is no infective endocarditis. A 2D echo will be ordered. 

5) DIABETES MELLITUS TYPE 2:  Monitor of blood glucose control was discussed.  We will 
quantify proteinuria. Once renal function is stable will consider ACE or ARB.  Advise to 
closely follow the patient. The rest of the electrolytes, labs, medicines were reviewed 
and discussed with Dr. Pandey and the patient at bedside.

## 2019-04-24 LAB
ALBUMIN SERPL-MCNC: 2.5 G/DL (ref 3.5–5)
ALP SERPL-CCNC: 181 U/L (ref 38–126)
ALT SERPL-CCNC: 22 U/L (ref 0–50)
ANA PATTERN INTERP DETAIL: (no result)
ANION GAP SERPL CALC-SCNC: 13.3 MEQ/L (ref 5–15)
AST SERPL QL: 20 U/L (ref 17–59)
BILIRUB BLD-MCNC: 0.7 MG/DL (ref 0.2–1.3)
BUN SERPL-MCNC: 39 MG/DL (ref 9–20)
C3 SERPL-MCNC: 206 MG/DL (ref 88–201)
C4 SERPL-MCNC: 36 MG/DL (ref 10–40)
CALCIUM SPEC-MCNC: 8 MG/DL (ref 8.4–10.2)
CELLS COUNTED: 100
CHLORIDE SERPL-SCNC: 108 MMOL/L (ref 98–107)
CO2 SERPL-SCNC: 20 MMOL/L (ref 22–30)
CREAT SERPL-MCNC: 3.57 MG/DL (ref 0.66–1.25)
GLUCOSE SERPL-MCNC: 116 MG/DL (ref 74–106)
GRANULOCYTES # BLD AUTO: 11.02 10*3/UL (ref 1.4–6.9)
HCT VFR BLD AUTO: 36.7 % (ref 42–50)
HGB BLD-MCNC: 11.7 GM/DL (ref 12.5–18)
MANUAL DIF COMMENT BLD-IMP: NORMAL
MCH RBC QN AUTO: 28.2 PG (ref 26–32)
MCHC RBC AUTO-ENTMCNC: 31.9 G/DL (ref 32–36)
NEUTS BAND # BLD MANUAL: 1 % (ref 0–2)
PLATELET # BLD AUTO: 429 K/MM3 (ref 150–450)
POTASSIUM SERPLBLD-SCNC: 4 MMOL/L (ref 3.5–5.1)
PROT SERPL-MCNC: 6.4 G/DL (ref 6.3–8.2)
RBC # BLD AUTO: 4.14 M/MM3 (ref 4.1–5.6)
SODIUM SERPL-SCNC: 138 MMOL/L (ref 137–145)
TOXIC GRANULES BLD QL SMEAR: (no result)
WBC # BLD AUTO: 14.4 K/MM3 (ref 4–10.5)

## 2019-04-24 RX ADMIN — TRAMADOL HYDROCHLORIDE PRN MG: 50 TABLET, FILM COATED ORAL at 06:19

## 2019-04-24 RX ADMIN — TORSEMIDE SCH MG: 20 TABLET ORAL at 21:48

## 2019-04-24 RX ADMIN — GABAPENTIN PRN MG: 300 CAPSULE ORAL at 21:47

## 2019-04-24 RX ADMIN — DEXTROSE MONOHYDRATE SCH MLS/HR: 50 INJECTION, SOLUTION INTRAVENOUS at 17:51

## 2019-04-24 RX ADMIN — ENOXAPARIN SODIUM SCH MG: 30 INJECTION, SOLUTION INTRAVENOUS; SUBCUTANEOUS at 09:30

## 2019-04-24 RX ADMIN — TORSEMIDE SCH MG: 20 TABLET ORAL at 09:29

## 2019-04-24 RX ADMIN — CLONIDINE HYDROCHLORIDE SCH MG: 0.1 TABLET ORAL at 14:40

## 2019-04-24 RX ADMIN — POTASSIUM CHLORIDE SCH MEQ: 10 TABLET, EXTENDED RELEASE ORAL at 21:48

## 2019-04-24 RX ADMIN — INSULIN ASPART PRN UNIT: 100 INJECTION, SOLUTION INTRAVENOUS; SUBCUTANEOUS at 18:17

## 2019-04-24 RX ADMIN — DEXTROSE MONOHYDRATE SCH MLS/HR: 50 INJECTION, SOLUTION INTRAVENOUS at 11:51

## 2019-04-24 RX ADMIN — TRAMADOL HYDROCHLORIDE PRN MG: 50 TABLET, FILM COATED ORAL at 22:13

## 2019-04-24 RX ADMIN — DEXTROSE MONOHYDRATE SCH MLS/HR: 50 INJECTION, SOLUTION INTRAVENOUS at 05:55

## 2019-04-24 RX ADMIN — FENOFIBRATE SCH MG: 145 TABLET ORAL at 09:29

## 2019-04-24 RX ADMIN — INSULIN GLARGINE SCH UNIT: 100 INJECTION, SOLUTION SUBCUTANEOUS at 21:48

## 2019-04-24 RX ADMIN — CLONIDINE HYDROCHLORIDE SCH MG: 0.1 TABLET ORAL at 09:29

## 2019-04-24 RX ADMIN — CLONIDINE HYDROCHLORIDE SCH MG: 0.1 TABLET ORAL at 21:47

## 2019-04-24 RX ADMIN — DEXTROSE MONOHYDRATE SCH MLS/HR: 50 INJECTION, SOLUTION INTRAVENOUS at 23:31

## 2019-04-24 RX ADMIN — INSULIN GLARGINE SCH UNIT: 100 INJECTION, SOLUTION SUBCUTANEOUS at 08:12

## 2019-04-24 RX ADMIN — POTASSIUM CHLORIDE SCH MEQ: 10 TABLET, EXTENDED RELEASE ORAL at 09:29

## 2019-04-24 NOTE — PCM.NOTE
Date and Time: 04/24/19  0847





Subjective Assessment: 





Patient had I and D of right foot yesterday afternoon with Dr. Johnson.  

Patient denies pain.  He states his appetite has been good and no constipation. 

He states he hasn't needed the morphine for pain and has mostly just been 

taking tylenol.  He reports one of the nephrologists came by again yesterday to 

see him. 





Objective Exam


General Appearance: no apparent distress, alert, obese, other (sitting up 

eating his breakfast; right foot wrapped.)


Neurologic Exam: alert, cooperative, normal mood/affect


Skin Exam: normal color, warm, other (mild erythema of right leg above bandage)


Respiratory Exam: normal breath sounds, lungs clear, No crackles/rales, No 

rhonchi, No wheezing


Cardiovascular Exam: regular rate/rhythm, normal heart sounds, No murmur, No 

friction rub, No gallop


Extremity Exam: other (+2 edema to knees bilat)





OBJECTIVE DATA


Vital Signs: 


 Vital Signs - 24 hr











  Temp Pulse Resp BP Pulse Ox


 


 04/24/19 08:00  98.4 F  71  18  129/77  97


 


 04/24/19 04:00  97.6 F  65  20  158/76  95


 


 04/24/19 00:03  97.5 F  62  18  124/63  95


 


 04/23/19 22:00  97.8 F  64  18  135/68  96


 


 04/23/19 18:00  97.8 F  64  18  135/68  96


 


 04/23/19 14:55  97.9 F  68  18  173/80  93 L


 


 04/23/19 12:57  97.6 F  67  18  139/81  95


 


 04/23/19 11:52  97.6 F  67  18  139/81  95








 Pain Assessment - Last Documented











Pain Intensity                 3


 


Pain Scale Used                FLACC











Intake and Output: 


 Intake & Output











 04/22/19 04/23/19 04/24/19 04/25/19





 06:59 06:59 06:59 06:59


 


Intake Total 4948 2400 1060 


 


Output Total  2100 1950 550


 


Balance 4948 300 -890 -550


 


Weight 152.5 kg 152.6 kg 152.2 kg 











Lab Results: 


 Accuchecks











Date                           04/23/19


 


Time                           21:00


 


Accucheck Value:               227


 


Accucheck Value:               103


 


Accucheck Value:               118











 Lab Results-Last 24 Hours











  04/22/19 04/23/19 04/24/19 Range/Units





  22:20 05:00 05:31 


 


WBC    14.4 H  (4.0-10.5)  K/mm3


 


RBC    4.14  (4.1-5.6)  M/mm3


 


Hgb    11.7 L  (12.5-18.0)  gm/dl


 


Hct    36.7 L  (42-50)  %


 


MCV    88.6  ()  fl


 


MCH    28.2  (26-32)  pg


 


MCHC    31.9 L  (32-36)  g/dl


 


RDW    15.6 H  (11.5-14.0)  %


 


Plt Count    429  (150-450)  K/mm3


 


MPV    9.3  (6-9.5)  fl


 


Absolute Granulocytes    11.02 H  (1.4-6.9)  


 


Vega Stain for PMNs  1-10/hpf H    (No Pmn)  


 


Sodium   139   (137-145)  mmol/L


 


Potassium   3.9   (3.5-5.1)  mmol/L


 


Chloride   109 H   ()  mmol/L


 


Carbon Dioxide   22   (22-30)  mmol/L


 


Anion Gap   12.5   (5-15)  MEQ/L


 


BUN   36 H   (9-20)  mg/dL


 


Creatinine   3.61 H   (0.66-1.25)  mg/dL


 


Estimated GFR   19.3   ML/MIN


 


Glucose   94   ()  mg/dL


 


Calcium   8.5   (8.4-10.2)  mg/dL


 


Total Bilirubin   1.00   (0.2-1.3)  mg/dL


 


AST   36   (17-59)  U/L


 


ALT   27   (0-50)  U/L


 


Alkaline Phosphatase   195 H   ()  U/L


 


Serum Total Protein   6.5   (6.3-8.2)  g/dL


 


Albumin   2.5 L   (3.5-5.0)  g/dL


 


Ur Eosinophil Smear  See Note    


 


Eosinophil Smear  <1/hpf H    (No Eos)  














  04/24/19 Range/Units





  05:31 


 


WBC   (4.0-10.5)  K/mm3


 


RBC   (4.1-5.6)  M/mm3


 


Hgb   (12.5-18.0)  gm/dl


 


Hct   (42-50)  %


 


MCV   ()  fl


 


MCH   (26-32)  pg


 


MCHC   (32-36)  g/dl


 


RDW   (11.5-14.0)  %


 


Plt Count   (150-450)  K/mm3


 


MPV   (6-9.5)  fl


 


Absolute Granulocytes   (1.4-6.9)  


 


Vega Stain for PMNs   (No Pmn)  


 


Sodium  138  (137-145)  mmol/L


 


Potassium  4.0  (3.5-5.1)  mmol/L


 


Chloride  108 H  ()  mmol/L


 


Carbon Dioxide  20 L  (22-30)  mmol/L


 


Anion Gap  13.3  (5-15)  MEQ/L


 


BUN  39 H  (9-20)  mg/dL


 


Creatinine  3.57 H  (0.66-1.25)  mg/dL


 


Estimated GFR  19.5  ML/MIN


 


Glucose  116 H  ()  mg/dL


 


Calcium  8.0 L  (8.4-10.2)  mg/dL


 


Total Bilirubin  0.70  (0.2-1.3)  mg/dL


 


AST  20  (17-59)  U/L


 


ALT  22  (0-50)  U/L


 


Alkaline Phosphatase  181 H  ()  U/L


 


Serum Total Protein  6.4  (6.3-8.2)  g/dL


 


Albumin  2.5 L  (3.5-5.0)  g/dL


 


Ur Eosinophil Smear   


 


Eosinophil Smear   (No Eos)  











Radiology Exams: 


 Radiology Procedures











 Category Date Time Status


 


 ECHO W/2D AND DOPPLER [US] Routine Exams  04/23/19 08:00 Draft


 


 KIDNEY [US] Routine Exams  04/23/19 08:00 Completed


 


 MRI LOWER EXT W/O CONTRAST [MRI] Routine Exams  04/22/19 08:27 Completed














Assessment/Plan


(1) Sepsis


Current Visit: No   Status: Acute   


Assessment & Plan: 


Improving; vital signs stable; WBC better this AM;  abscess incised and drained 

yesterday.  continue Zosyn.








(2) Diabetic foot ulcer associated with type 2 diabetes mellitus


Current Visit: No   Status: Acute   


Assessment & Plan: 


Continue PT for wound care of foot;  surgeon's op report has not been 

transcribed yet.  I appreciate his help with this patient. 


Code(s): E11.621 - TYPE 2 DIABETES MELLITUS WITH FOOT ULCER; L97.509 - NON-

PRESSURE CHRONIC ULCER OTH PRT UNSP FOOT W UNSP SEVERITY   





(3) Type II diabetes mellitus, well controlled


Current Visit: Yes   Status: Acute   


Assessment & Plan: 


Continue current medications.


Code(s): E11.9 - TYPE 2 DIABETES MELLITUS WITHOUT COMPLICATIONS   





(4) Renal failure (ARF), acute on chronic


Current Visit: Yes   Status: Acute   Code(s): N17.9 - ACUTE KIDNEY FAILURE, 

UNSPECIFIED; N18.9 - CHRONIC KIDNEY DISEASE, UNSPECIFIED   





(5) Hypertension


Current Visit: Yes   Status: Acute   


Assessment & Plan: 


Fairly well controlled. Continue current medication.


Code(s): I10 - ESSENTIAL (PRIMARY) HYPERTENSION   





(6) Charcot foot due to diabetes mellitus


Current Visit: Yes   Status: Acute   Code(s): E11.610 - TYPE 2 DIABETES 

MELLITUS W DIABETIC NEUROPATHIC ARTHROPATHY   





(7) Foot abscess, right


Current Visit: Yes   Status: Acute   


Assessment & Plan: 


s/p drainage on 4/23/19. Continue zosyn. 


Code(s): L02.611 - CUTANEOUS ABSCESS OF RIGHT FOOT

## 2019-04-25 LAB
ALBUMIN SERPL-MCNC: 2.4 G/DL (ref 3.5–5)
ALP SERPL-CCNC: 159 U/L (ref 38–126)
ALT SERPL-CCNC: 19 U/L (ref 0–50)
ANION GAP SERPL CALC-SCNC: 14.3 MEQ/L (ref 5–15)
ANISOCYTOSIS BLD QL: (no result)
APTT PPP: 29.7 SECONDS (ref 24.1–36.1)
AST SERPL QL: 19 U/L (ref 17–59)
BILIRUB BLD-MCNC: 0.6 MG/DL (ref 0.2–1.3)
BUN SERPL-MCNC: 38 MG/DL (ref 9–20)
CALCIUM SPEC-MCNC: 7.8 MG/DL (ref 8.4–10.2)
CELLS COUNTED: 100
CHLORIDE SERPL-SCNC: 106 MMOL/L (ref 98–107)
CO2 SERPL-SCNC: 19 MMOL/L (ref 22–30)
CREAT SERPL-MCNC: 4.08 MG/DL (ref 0.66–1.25)
GLUCOSE SERPL-MCNC: 143 MG/DL (ref 74–106)
HCT VFR BLD AUTO: 34.3 % (ref 42–50)
HGB BLD-MCNC: 10.9 GM/DL (ref 12.5–18)
INR PPP: 1.73 (ref 0.8–3)
MANUAL DIF COMMENT BLD-IMP: (no result)
MCH RBC QN AUTO: 28.1 PG (ref 26–32)
MCHC RBC AUTO-ENTMCNC: 31.8 G/DL (ref 32–36)
NEUTS BAND # BLD MANUAL: 3 % (ref 0–2)
PLATELET # BLD AUTO: 525 K/MM3 (ref 150–450)
POTASSIUM SERPLBLD-SCNC: 4.2 MMOL/L (ref 3.5–5.1)
PROT SERPL-MCNC: 6.3 G/DL (ref 6.3–8.2)
PROTHROMBIN TIME: 20.2 SECONDS (ref 8.83–12.87)
RBC # BLD AUTO: 3.87 M/MM3 (ref 4.1–5.6)
SODIUM SERPL-SCNC: 135 MMOL/L (ref 137–145)
TOXIC GRANULES BLD QL SMEAR: (no result)
WBC # BLD AUTO: 14.9 K/MM3 (ref 4–10.5)

## 2019-04-25 RX ADMIN — CLONIDINE HYDROCHLORIDE SCH MG: 0.1 TABLET ORAL at 22:21

## 2019-04-25 RX ADMIN — CLONIDINE HYDROCHLORIDE SCH MG: 0.1 TABLET ORAL at 14:31

## 2019-04-25 RX ADMIN — DEXTROSE MONOHYDRATE SCH MLS/HR: 50 INJECTION, SOLUTION INTRAVENOUS at 05:51

## 2019-04-25 RX ADMIN — CEFAZOLIN SODIUM SCH MLS/HR: 1 SOLUTION INTRAVENOUS at 11:44

## 2019-04-25 RX ADMIN — INSULIN ASPART PRN UNIT: 100 INJECTION, SOLUTION INTRAVENOUS; SUBCUTANEOUS at 22:22

## 2019-04-25 RX ADMIN — POTASSIUM CHLORIDE SCH MEQ: 10 TABLET, EXTENDED RELEASE ORAL at 10:15

## 2019-04-25 RX ADMIN — TORSEMIDE SCH MG: 20 TABLET ORAL at 22:21

## 2019-04-25 RX ADMIN — INSULIN GLARGINE SCH UNIT: 100 INJECTION, SOLUTION SUBCUTANEOUS at 08:28

## 2019-04-25 RX ADMIN — CLONIDINE HYDROCHLORIDE SCH MG: 0.1 TABLET ORAL at 10:16

## 2019-04-25 RX ADMIN — CEFAZOLIN SODIUM SCH MLS/HR: 1 SOLUTION INTRAVENOUS at 22:22

## 2019-04-25 RX ADMIN — INSULIN GLARGINE SCH UNIT: 100 INJECTION, SOLUTION SUBCUTANEOUS at 22:21

## 2019-04-25 RX ADMIN — POTASSIUM CHLORIDE SCH MEQ: 10 TABLET, EXTENDED RELEASE ORAL at 22:21

## 2019-04-25 RX ADMIN — FENOFIBRATE SCH MG: 145 TABLET ORAL at 10:15

## 2019-04-25 RX ADMIN — ENOXAPARIN SODIUM SCH MG: 30 INJECTION, SOLUTION INTRAVENOUS; SUBCUTANEOUS at 10:15

## 2019-04-25 RX ADMIN — TORSEMIDE SCH MG: 20 TABLET ORAL at 10:15

## 2019-04-25 NOTE — PCM.NOTE
Date and Time: 04/25/19  1007





Subjective Assessment: 





Patient reports that he feels better and does not have much pain in his foot 

besides the normal pain he has from his Charcot.  He reports he would like to 

go home.  His appetite has been good; no constipation; no abdominal pain. He 

has been using a walker to get around but does not have one at home. He plans 

to follow up with Dr. Dave long term.  PT is in the room but waiting to 

undress his foot until the surgeon is available to look at it and she states 

she will take pictures for me. 





Objective Exam


General Appearance: no apparent distress, alert, obese


Neurologic Exam: alert, cooperative, normal mood/affect


Skin Exam: normal color, warm, dry, other (right foot wrapped and dressed; +2 

edema in left lower extremity and +3 edema in right lower extremity, no c/c)


Cardiovascular Exam: regular rate/rhythm, normal heart sounds, No murmur, No 

gallop


Gastrointestinal/Abdomen Exam: soft, normal bowel sounds, No tenderness, No 

distention, No mass





OBJECTIVE DATA


Vital Signs: 


 Vital Signs - 24 hr











  Temp Pulse Resp BP Pulse Ox


 


 04/25/19 07:21  98.9 F  75  20  137/68  93 L


 


 04/25/19 04:00  100.0 F  73  18  143/69  94 L


 


 04/25/19 00:00  99.5 F  82  20  156/74  97


 


 04/24/19 20:00  98.8 F  73  19  157/73  94 L


 


 04/24/19 16:00  98.1 F  68  18  174/80  95


 


 04/24/19 12:00  98.3 F  68  18  151/68  95








 Pain Assessment - Last Documented











Pain Intensity                 0


 


Pain Scale Used                0-10 Pain Scale,FLACC











Intake and Output: 


 Intake & Output











 04/23/19 04/24/19 04/25/19 04/26/19





 06:59 06:59 06:59 06:59


 


Intake Total 2400 1060 4478 240


 


Output Total 2100 1950 1450 


 


Balance 300 -890 3028 240


 


Weight 152.6 kg 152.2 kg  151.8 kg











Lab Results: 


 Accuchecks











Date                           04/24/19


 


Date                           04/24/19


 


Date                           04/24/19


 


Time                           22:00


 


Time                           16:30


 


Time                           11:30


 


Accucheck Value:               192


 


Accucheck Value:               219


 


Accucheck Value:               141











 Lab Results-Last 24 Hours











  04/22/19 04/24/19 04/25/19 Range/Units





  05:50 05:31 05:43 


 


WBC    14.9 H  (4.0-10.5)  K/mm3


 


RBC    3.87 L  (4.1-5.6)  M/mm3


 


Hgb    10.9 L  (12.5-18.0)  gm/dl


 


Hct    34.3 L  (42-50)  %


 


MCV    88.6  ()  fl


 


MCH    28.1  (26-32)  pg


 


MCHC    31.8 L  (32-36)  g/dl


 


RDW    15.3 H  (11.5-14.0)  %


 


Plt Count    525 H  (150-450)  K/mm3


 


MPV    9.0  (6-9.5)  fl


 


Segmented Neutrophils   73 H  75 H  (36.-66.)  %


 


Band Neutrophils   1  3 H  (0.0-2.0)  %


 


Lymphocytes (Manual)   18 L  14 L  (24-44)  %


 


Monocytes (Manual)   3  5  (0.0-12.0)  %


 


Eosinophils (Manual)   5 H  3  (0.00-3.0)  %


 


Toxic Granulation   2+  1+  


 


Platelet Estimate   NORMAL  INCREASED  (NORMAL)  


 


RBC Morphology   NORMAL  ABNORMAL  


 


Anisocytosis    1+  


 


Sodium     (137-145)  mmol/L


 


Potassium     (3.5-5.1)  mmol/L


 


Chloride     ()  mmol/L


 


Carbon Dioxide     (22-30)  mmol/L


 


Anion Gap     (5-15)  MEQ/L


 


BUN     (9-20)  mg/dL


 


Creatinine     (0.66-1.25)  mg/dL


 


Estimated GFR     ML/MIN


 


Glucose     ()  mg/dL


 


Calcium     (8.4-10.2)  mg/dL


 


Total Bilirubin     (0.2-1.3)  mg/dL


 


AST     (17-59)  U/L


 


ALT     (0-50)  U/L


 


Alkaline Phosphatase     ()  U/L


 


Serum Total Protein     (6.3-8.2)  g/dL


 


Total Protein (PEP)  Pending    


 


Albumin  Pending    


 


Alpha-1-Globulins  Pending    


 


Alpha-2-Globulins  Pending    


 


Beta-2-Globulin  Pending    


 


Gamma Globulins  Pending    


 


M-Christian Beta  Pending    


 


PEP Interpretation  Pending    


 


FIORDALIZA IgG Screen  Neg. at 1:80    (Neg. at 1:80)  


 


FIORDALIZA IgG Titer  Not Indicated    (Not Indicated)  


 


FIORDALIZA Pattern  Not Ind    


 


FIORDALIZA Interpretation  See Result Note:    


 


ANCA Screen  Neg. at 1:20    (Neg. at 1:20)  


 


ANCA Titer  Not Indicated    (Not Indicated)  


 


c-ANCA Titer  Not Indicated    (Not Indicated)  


 


c-ANCA  Neg. at 1:20    (Neg. at 1:20)  


 


p-ANCA Titer  Not Indicated    (Not Indicated)  


 


p-ANCA  Neg. at 1:20    (Neg. at 1:20)  


 


Glomerular Base Mem IgA  Pending    


 


Complement C3  206 H    ()  mg/dL


 


Complement C4  36    (10-40)  mg/dL














  04/25/19 Range/Units





  05:43 


 


WBC   (4.0-10.5)  K/mm3


 


RBC   (4.1-5.6)  M/mm3


 


Hgb   (12.5-18.0)  gm/dl


 


Hct   (42-50)  %


 


MCV   ()  fl


 


MCH   (26-32)  pg


 


MCHC   (32-36)  g/dl


 


RDW   (11.5-14.0)  %


 


Plt Count   (150-450)  K/mm3


 


MPV   (6-9.5)  fl


 


Segmented Neutrophils   (36.-66.)  %


 


Band Neutrophils   (0.0-2.0)  %


 


Lymphocytes (Manual)   (24-44)  %


 


Monocytes (Manual)   (0.0-12.0)  %


 


Eosinophils (Manual)   (0.00-3.0)  %


 


Toxic Granulation   


 


Platelet Estimate   (NORMAL)  


 


RBC Morphology   


 


Anisocytosis   


 


Sodium  135 L  (137-145)  mmol/L


 


Potassium  4.2  (3.5-5.1)  mmol/L


 


Chloride  106  ()  mmol/L


 


Carbon Dioxide  19 L  (22-30)  mmol/L


 


Anion Gap  14.3  (5-15)  MEQ/L


 


BUN  38 H  (9-20)  mg/dL


 


Creatinine  4.08 H  (0.66-1.25)  mg/dL


 


Estimated GFR  16.7  ML/MIN


 


Glucose  143 H  ()  mg/dL


 


Calcium  7.8 L  (8.4-10.2)  mg/dL


 


Total Bilirubin  0.60  (0.2-1.3)  mg/dL


 


AST  19  (17-59)  U/L


 


ALT  19  (0-50)  U/L


 


Alkaline Phosphatase  159 H  ()  U/L


 


Serum Total Protein  6.3  (6.3-8.2)  g/dL


 


Total Protein (PEP)   


 


Albumin  2.4 L  


 


Alpha-1-Globulins   


 


Alpha-2-Globulins   


 


Beta-2-Globulin   


 


Gamma Globulins   


 


M-Christian Beta   


 


PEP Interpretation   


 


FIORDALIZA IgG Screen   (Neg. at 1:80)  


 


FIORDALIZA IgG Titer   (Not Indicated)  


 


FIORDALIZA Pattern   


 


FIORDALIZA Interpretation   


 


ANCA Screen   (Neg. at 1:20)  


 


ANCA Titer   (Not Indicated)  


 


c-ANCA Titer   (Not Indicated)  


 


c-ANCA   (Neg. at 1:20)  


 


p-ANCA Titer   (Not Indicated)  


 


p-ANCA   (Neg. at 1:20)  


 


Glomerular Base Mem IgA   


 


Complement C3   ()  mg/dL


 


Complement C4   (10-40)  mg/dL














Assessment/Plan


(1) Acute osteomyelitis of metatarsal bone of right foot


Current Visit: Yes   Status: Acute   


Assessment & Plan: 


MRI on 4/22/19 was concerning for osteomyelitis of the 2nd metatarsal. He 

started IV antibiotics on 4/17/19. He also continues to have an elevated WBC 

count.  He will need a 6 week course of antibiotics.  Will order a PICC line to 

be placed today; patient told discharge planner he could give himself the IV 

antibiotics at home and has done this before. I have updated Dr. Dave 

today as he will need to follow up closely with him as well as his Primary care 

provider. Patient declines need for Home Health. If he does not improve 

clinically, he may need further surgical intervention with Dr. Dave.  


Code(s): M86.171 - OTHER ACUTE OSTEOMYELITIS, RIGHT ANKLE AND FOOT   





(2) Sepsis


Current Visit: No   Status: Acute   


Qualifiers: 


   Sepsis type: methicillin susceptible Staphylococcus aureus   Qualified Code(s

): A41.01 - Sepsis due to Methicillin susceptible Staphylococcus aureus   


Assessment & Plan: 


Resolved with IV antibiotics; day 8. Afebrile; blood pressure stable; no 

tachycardia.  Echo normal; source most likely diabetic foot abscess/ulcer.








(3) Diabetic foot ulcer associated with type 2 diabetes mellitus


Current Visit: No   Status: Acute   


Assessment & Plan: 


He will follow up with Dr. Dave.


Code(s): E11.621 - TYPE 2 DIABETES MELLITUS WITH FOOT ULCER; L97.509 - NON-

PRESSURE CHRONIC ULCER OTH PRT UNSP FOOT W UNSP SEVERITY   





(4) Type II diabetes mellitus, well controlled


Current Visit: Yes   Status: Acute   


Assessment & Plan: 


Continue current doses of insulin.


Code(s): E11.9 - TYPE 2 DIABETES MELLITUS WITHOUT COMPLICATIONS   





(5) Renal failure (ARF), acute on chronic


Current Visit: Yes   Status: Acute   


Assessment & Plan: 


Nephrologists have been following. Will ask staff to update them on his 

creatinine.


Code(s): N17.9 - ACUTE KIDNEY FAILURE, UNSPECIFIED; N18.9 - CHRONIC KIDNEY 

DISEASE, UNSPECIFIED   





(6) Hypertension


Current Visit: Yes   Status: Acute   


Assessment & Plan: 


Better controlled with adjustments from nephrologist.


Code(s): I10 - ESSENTIAL (PRIMARY) HYPERTENSION   





(7) Charcot foot due to diabetes mellitus


Current Visit: Yes   Status: Acute   Code(s): E11.610 - TYPE 2 DIABETES 

MELLITUS W DIABETIC NEUROPATHIC ARTHROPATHY   





(8) Foot abscess, right


Current Visit: Yes   Status: Acute   


Assessment & Plan: 


s/p I and D on 4/23/19.


Code(s): L02.611 - CUTANEOUS ABSCESS OF RIGHT FOOT

## 2019-04-25 NOTE — XRAY
Indication: Long-term IV access and therapy for right foot infection.



Informed consent obtained.  Patient was placed on the fluoroscopic table in a

supine position.  Initial sonographic imaging of the right upper extremity was

performed for localization of patent veins.  The right upper extremity was

then prepped and draped in sterile fashion.  Tourniquet applied.  1% lidocaine

plain used for local anesthesia.  Using ultrasound guidance and a

micropuncture needle, a basilic vein above the elbow was successfully

percutaneously cannulized.  A floppy tip 0.018 guidewire inserted.  Tourniquet

released.  Needle was exchanged for a 5 [place] dilator peel-away sheath

catheter.  Ultimately a 5 [place] double-lumen PICC line was inserted over a

longer 0.018 guidewire with the tip positioned in the distal SVC using

fluoroscopic guidance.  Guidewire removed.  Both ports flushed with

heparinized saline.  Catheter was secured.  Postoperative instructions and

orders given.  Patient discharged in good condition.



Impression: Technically successful right upper extremity PICC line placement

using ultrasound and fluoroscopic guidance.  No immediate complications.

Approximately 2 cc blood loss.  Approximately 0.3 minute of fluoroscopy used.

Catheter length is 50 cm.

## 2019-04-26 VITALS — OXYGEN SATURATION: 94 % | SYSTOLIC BLOOD PRESSURE: 155 MMHG | DIASTOLIC BLOOD PRESSURE: 70 MMHG | HEART RATE: 69 BPM

## 2019-04-26 LAB
ALBUMIN SERPL-MCNC: 2.6 G/DL (ref 3.5–5)
ALP SERPL-CCNC: 160 U/L (ref 38–126)
ALT SERPL-CCNC: 17 U/L (ref 0–50)
ANION GAP SERPL CALC-SCNC: 16.5 MEQ/L (ref 5–15)
AST SERPL QL: 23 U/L (ref 17–59)
BETA1 GLOB SERPL ELPH-MCNC: 0.42 G/DL (ref 0.35–0.66)
BETA2 GLOB MFR SERPL ELPH: 0.48 G/DL (ref 0.18–0.5)
BILIRUB BLD-MCNC: 0.6 MG/DL (ref 0.2–1.3)
BUN SERPL-MCNC: 44 MG/DL (ref 9–20)
CALCIUM SPEC-MCNC: 8.2 MG/DL (ref 8.4–10.2)
CELLS COUNTED: 100
CHLORIDE SERPL-SCNC: 106 MMOL/L (ref 98–107)
CO2 SERPL-SCNC: 19 MMOL/L (ref 22–30)
CREAT SERPL-MCNC: 4.99 MG/DL (ref 0.66–1.25)
GLUCOSE SERPL-MCNC: 93 MG/DL (ref 74–106)
GRANULOCYTES # BLD AUTO: 10.7 10*3/UL (ref 1.4–6.9)
HCT VFR BLD AUTO: 35.6 % (ref 42–50)
HGB BLD-MCNC: 11.4 GM/DL (ref 12.5–18)
MANUAL DIF COMMENT BLD-IMP: (no result)
MCH RBC QN AUTO: 28.2 PG (ref 26–32)
MCHC RBC AUTO-ENTMCNC: 32 G/DL (ref 32–36)
NEUTS BAND # BLD MANUAL: 9 % (ref 0–2)
PLATELET # BLD AUTO: 567 K/MM3 (ref 150–450)
POIKILOCYTOSIS BLD QL SMEAR: (no result)
POTASSIUM SERPLBLD-SCNC: 4.4 MMOL/L (ref 3.5–5.1)
PROT SERPL-MCNC: 6.8 G/DL (ref 6.3–8.2)
RBC # BLD AUTO: 4.04 M/MM3 (ref 4.1–5.6)
SODIUM SERPL-SCNC: 137 MMOL/L (ref 137–145)
TOXIC GRANULES BLD QL SMEAR: (no result)
WBC # BLD AUTO: 14.4 K/MM3 (ref 4–10.5)

## 2019-04-26 RX ADMIN — CEFAZOLIN SODIUM SCH MLS/HR: 1 SOLUTION INTRAVENOUS at 10:10

## 2019-04-26 RX ADMIN — ENOXAPARIN SODIUM SCH MG: 30 INJECTION, SOLUTION INTRAVENOUS; SUBCUTANEOUS at 10:11

## 2019-04-26 RX ADMIN — FENOFIBRATE SCH MG: 145 TABLET ORAL at 10:10

## 2019-04-26 RX ADMIN — POTASSIUM CHLORIDE SCH MEQ: 10 TABLET, EXTENDED RELEASE ORAL at 10:10

## 2019-04-26 RX ADMIN — TORSEMIDE SCH MG: 20 TABLET ORAL at 10:11

## 2019-04-26 RX ADMIN — HEPARIN SODIUM (PORCINE) LOCK FLUSH IV SOLN 100 UNIT/ML PRN UNITS: 100 SOLUTION at 12:45

## 2019-04-26 RX ADMIN — CLONIDINE HYDROCHLORIDE SCH MG: 0.1 TABLET ORAL at 08:03

## 2019-04-26 RX ADMIN — INSULIN GLARGINE SCH UNIT: 100 INJECTION, SOLUTION SUBCUTANEOUS at 08:02

## 2019-04-26 RX ADMIN — HEPARIN SODIUM (PORCINE) LOCK FLUSH IV SOLN 100 UNIT/ML PRN UNITS: 100 SOLUTION at 12:37

## 2019-04-27 LAB — PATH INTERP SPEC-IMP: (no result)

## 2019-05-01 NOTE — DS
DISCHARGE DIAGNOSES: 

1) SEPSIS DUE TO METHICILLIN SUSCEPTIBLE STAPHYLOCOCCUS AUREUS. 

2) DIABETIC FOOT ULCER. 

3) DIABETES MELLITUS TYPE 2, CONTROLLED. 

4) ACUTE ON CHRONIC RENAL FAILURE. 

5) HYPERTENSION. 

6) CHARCOT FOOT. 

7) FOOT ABCESS.

8) ACUTE OSTEOMYELITIS OF THE METATARSAL BONE OF THE RIGHT FOOT. 

 

DISCHARGE PHYSICAL EXAMINATION: 

VITALS: Temperature current 99.3F, temperature max 100F, heart rate 69, respiratory rate 
18, blood pressure 155/70. Oxygen saturation 94% on room air.  

GENERAL: The patient was a pleasant talkative man sitting up in no acute distress.   

CVS: He had a regular rate and rhythm. No murmurs, gallops or rubs.

CHEST: Clear to auscultation bilaterally. No crackles or wheezes. 

ABDOMEN: Soft, nontender, nondistended with normal bowel sounds. 

EXTREMITIES: He has +2 pitting edema to his knees bilaterally. His right foot was wrapped 
and dressed with recent pictures in the chart of his wound.  



HOSPITAL COURSE: 

1) SEPSIS DUE TO METHICILLIN SUSCEPTIBLE STAPHYLOCOCCUS AUREUS: He was started on 
antibiotics when he was in the emergency department. He was started on Zosyn and 
Vancomycin with pharmacy to help dose on admission. His blood cultures quickly started 
growing gram-positive cocci with identification coming out as a methicillin susceptible 
Staphylococcus aureus and wound culture from his foot also grew this. He was continued on 
Zosyn and Vancomycin until the blood cultures came back with methicillin susceptible 
Staphylococcus aureus and that day that I had received those results we stopped the 
Vancomycin and continued with the Zosyn. He improved from standpoint of sepsis. However, 
he continued to have swelling in his feet bilaterally and when I rounded on him on 
04/22/2019, he had developed some fluctuance of his foot where the wound was. 

2) DIABETIC FOOT ULCER:  The general surgeons were consulted for incision and drainage and 
they did this on 04/23/2019 with drainage of pus and they took another wound culture. 

3) ACUTE ON CHRONIC RENAL FAILURE: The nephrologist was consulted over the weekend of 
04/20/2019 and saw the patient. At the time discharge his creatinine continued to slowly 
rise so the nephrologists were made aware of this on 04/26/2019 as their staff reported 
they were unable to reach them on 04/25/2019. His creatinine at the time of discharge was 
4.99. They asked to have him transferred to [facility] under the care 
of Dr. Alvarez for further treatment of this. 

4) HYPERTENSION: The nephrologist adjusted his blood pressure medicine and it fluctuated 
between being well controlled and being high.

5) CHARCOT FOOT: He usually sees Dr. Dave for this as an outpatient.

6) FOOT ABCESS: Again this was incised and drained by the general surgeons during his 
hospitalization. 

7) ACUTE OSTEOMYELITIS OF THE METATARSAL BONE OF THE RIGHT FOOT: The patient had MRI on 
the Monday before the foot was drained and there was concern for osteomyelitis so we asked 
to have a PICC line placed in his right arm in hopes that he could go home on IV 
antibiotics which changed to cefazolin close to the time of his discharge. However due to 
his acute renal failure, he required transfer to [facility] under the 
care of a nephrologist for possible dialysis.

 

DISCHARGE MEDICATIONS: This was left up to the nephrologist who was the accepting 
physician at [facility].

 

DISPOSITION: The patient was discharged to [facility] for specialty 
care and possible dialysis.

## 2019-08-02 ENCOUNTER — HOSPITAL ENCOUNTER (EMERGENCY)
Dept: HOSPITAL 33 - ED | Age: 49
Discharge: HOME | End: 2019-08-02
Payer: COMMERCIAL

## 2019-08-02 VITALS — HEART RATE: 90 BPM | SYSTOLIC BLOOD PRESSURE: 92 MMHG | OXYGEN SATURATION: 94 % | DIASTOLIC BLOOD PRESSURE: 69 MMHG

## 2019-08-02 DIAGNOSIS — R53.1: Primary | ICD-10-CM

## 2019-08-02 DIAGNOSIS — R11.2: ICD-10-CM

## 2019-08-02 LAB
ALBUMIN SERPL-MCNC: 4.1 G/DL (ref 3.5–5)
ALP SERPL-CCNC: 177 U/L (ref 38–126)
ALT SERPL-CCNC: 25 U/L (ref 0–50)
ANION GAP SERPL CALC-SCNC: 15.6 MEQ/L (ref 5–15)
ANISOCYTOSIS BLD QL: (no result)
AST SERPL QL: 30 U/L (ref 17–59)
BILIRUB BLD-MCNC: 1 MG/DL (ref 0.2–1.3)
BUN SERPL-MCNC: 26 MG/DL (ref 9–20)
CALCIUM SPEC-MCNC: 9.8 MG/DL (ref 8.4–10.2)
CELLS COUNTED: 100
CHLORIDE SERPL-SCNC: 94 MMOL/L (ref 98–107)
CO2 SERPL-SCNC: 31 MMOL/L (ref 22–30)
CREAT SERPL-MCNC: 3.52 MG/DL (ref 0.66–1.25)
GLUCOSE SERPL-MCNC: 220 MG/DL (ref 74–106)
GRANULOCYTES # BLD AUTO: 11.1 10*3/UL (ref 1.4–6.9)
HCT VFR BLD AUTO: 36.1 % (ref 42–50)
HGB BLD-MCNC: 11.2 GM/DL (ref 12.5–18)
MANUAL DIF COMMENT BLD-IMP: (no result)
MCH RBC QN AUTO: 25.8 PG (ref 26–32)
MCHC RBC AUTO-ENTMCNC: 31 G/DL (ref 32–36)
NEUTS BAND # BLD MANUAL: 1 % (ref 0–2)
PLATELET # BLD AUTO: 453 K/MM3 (ref 150–450)
POLYCHROMASIA BLD QL SMEAR: (no result)
POTASSIUM SERPLBLD-SCNC: 4.4 MMOL/L (ref 3.5–5.1)
PROT SERPL-MCNC: 8.8 G/DL (ref 6.3–8.2)
RBC # BLD AUTO: 4.34 M/MM3 (ref 4.1–5.6)
SODIUM SERPL-SCNC: 136 MMOL/L (ref 137–145)
VARIANT LYMPHS BLD QL SMEAR: 1 %
WBC # BLD AUTO: 12.5 K/MM3 (ref 4–10.5)

## 2019-08-02 PROCEDURE — 96372 THER/PROPH/DIAG INJ SC/IM: CPT

## 2019-08-02 PROCEDURE — 93005 ELECTROCARDIOGRAM TRACING: CPT

## 2019-08-02 PROCEDURE — 96374 THER/PROPH/DIAG INJ IV PUSH: CPT

## 2019-08-02 PROCEDURE — 84484 ASSAY OF TROPONIN QUANT: CPT

## 2019-08-02 PROCEDURE — 96375 TX/PRO/DX INJ NEW DRUG ADDON: CPT

## 2019-08-02 PROCEDURE — 83605 ASSAY OF LACTIC ACID: CPT

## 2019-08-02 PROCEDURE — 36415 COLL VENOUS BLD VENIPUNCTURE: CPT

## 2019-08-02 PROCEDURE — 71045 X-RAY EXAM CHEST 1 VIEW: CPT

## 2019-08-02 PROCEDURE — 87040 BLOOD CULTURE FOR BACTERIA: CPT

## 2019-08-02 PROCEDURE — 80053 COMPREHEN METABOLIC PANEL: CPT

## 2019-08-02 PROCEDURE — 99284 EMERGENCY DEPT VISIT MOD MDM: CPT

## 2019-08-02 PROCEDURE — 36000 PLACE NEEDLE IN VEIN: CPT

## 2019-08-02 PROCEDURE — 85025 COMPLETE CBC W/AUTO DIFF WBC: CPT

## 2019-08-02 PROCEDURE — 82140 ASSAY OF AMMONIA: CPT

## 2019-08-02 PROCEDURE — 93041 RHYTHM ECG TRACING: CPT

## 2019-08-02 PROCEDURE — 83880 ASSAY OF NATRIURETIC PEPTIDE: CPT

## 2019-08-02 RX ADMIN — PROMETHAZINE HYDROCHLORIDE ONE MG: 25 INJECTION INTRAMUSCULAR; INTRAVENOUS at 17:25

## 2019-08-02 RX ADMIN — ONDANSETRON ONE MG: 2 INJECTION, SOLUTION INTRAMUSCULAR; INTRAVENOUS at 18:28

## 2019-08-02 RX ADMIN — CEFEPIME HYDROCHLORIDE STA: 2 INJECTION, POWDER, FOR SOLUTION INTRAVENOUS at 18:12

## 2019-08-02 NOTE — ERPHSYRPT
- History of Present Illness


Time Seen by Provider: 08/02/19 15:40


Source: patient, EMS


Exam Limitations: no limitations


Physician History: 


50 y/o obese, insulin dependent diabetic white male with h/po htn on lisinopril 

on m-w-f dialysis presents to ED after 3 days in a row dialysis. pt not feeling 

well described as diaphoretic, nausea and vomiting for a day. pt feels weak. pt 

denies cp, denies soa and denies abd pain. 





Timing/Duration: day(s) (1)


Severity: moderate


Associated Symptoms: nausea, vomiting, weakness, No abdominal pain, No 

shortness of breath, No chest pain


Allergies/Adverse Reactions: 








No Known Drug Allergies Allergy (Verified 04/17/19 16:30)


 





Home Medications: 








Amlodipine Besylate 10 mg PO DAILY 04/17/19 [History]


Clonidine HCl 0.1 mg PO HS 04/17/19 [History]


Fenofibrate Nanocrystallized [Fenofibrate] 48 mg PO DAILY 04/17/19 [History]


Gabapentin 600 mg PO DAILY PRN 04/17/19 [History]


Insulin Glargine,Hum.rec.anlog [Lantus Solostar] 30 units SQ BID 04/17/19 [

History]


Lisinopril 40 mg PO DAILY 04/17/19 [History]


cloNIDine HCl [Clonidine HCl] 0.2 mg PO DAILY 04/17/19 [History]





Hx Tetanus, Diphtheria Vaccination/Date Given: Yes


Hx Influenza Vaccination/Date Given: Yes


Hx Pneumococcal Vaccination/Date Given: No





- Review of Systems


Constitutional: Weakness


Eyes: No Symptoms


Ears, Nose, & Throat: No Symptoms


Respiratory: No Symptoms


Cardiac: No Symptoms


Abdominal/Gastrointestinal: No Symptoms


Genitourinary Symptoms: No Symptoms


Musculoskeletal: No Symptoms


Skin: No Symptoms


Neurological: No Symptoms


Psychological: No Symptoms


Endocrine: No Symptoms


Hematologic/Lymphatic: No Symptoms


All Other Systems: Reviewed and Negative





- Past Medical History


Pertinent Past Medical History: Yes


Neurological History: No Pertinent History


ENT History: No Pertinent History


Cardiac History: Hypertension


Respiratory History: Pneumonia


Endocrine Medical History: Diabetes Type II


Musculoskeletal History: Other


GI Medical History: No Pertinent History


 History: No Pertinent History


Psycho-Social History: No Pertinent History


Male Reproductive Disorders: No Pertinent History





- Past Surgical History


Past Surgical History: No


Neuro Surgical History: No Pertinent History


Cardiac: No Pertinent History


Respiratory: No Pertinent History


Gastrointestinal: No Pertinent History


Genitourinary: No Pertinent History


Musculoskeletal: No Pertinent History


Male Surgical History: No Pertinent History





- Social History


Smoking Status: Never smoker


Exposure to second hand smoke: Yes


Alcohol Use: None


Drug Use: none


Patient Lives Alone: No


Significant Family History: no pertinent family hx





- Nursing Vital Signs


Nursing Vital Signs: 


 Initial Vital Signs











Temperature  98.4 F   08/02/19 15:34


 


Pulse Rate  84   08/02/19 15:34


 


Respiratory Rate  16   08/02/19 15:34


 


Blood Pressure  142/65   08/02/19 15:34


 


O2 Sat by Pulse Oximetry  89 L  08/02/19 15:34








 Pain Scale











Pain Intensity                 0

















- Physical Exam


General Appearance: no apparent distress, lethargy, obese


Eye Exam: PERRL/EOMI, eyes nml inspection


Ears, Nose, Throat Exam: normal ENT inspection, moist mucous membranes


Neck Exam: normal inspection, non-tender, supple, full range of motion


Respiratory Exam: normal breath sounds, lungs clear, airway intact, No chest 

tenderness, No respiratory distress


Cardiovascular Exam: regular rate/rhythm, normal heart sounds, normal 

peripheral pulses


Gastrointestinal/Abdomen Exam: soft, normal bowel sounds, No tenderness


Rectal Exam: not done


Back Exam: normal inspection, normal range of motion, No CVA tenderness, No 

vertebral tenderness


Extremity Exam: normal inspection, normal range of motion, pelvis stable


Neurologic Exam: oriented x 3, cooperative, CNs II-XII nml as tested, other (

mildy lethargic but rousable)


Lymphatic Exam: No adenopathy


**SpO2 Interpretation**: hypoxic


O2 Delivery: Room Air





- Course


Nursing assessment & vital signs reviewed: Yes


Ordered Tests: 


 Active Orders 24 hr











 Category Date Time Status


 


 Cardiac Monitor STAT Care  08/02/19 15:53 Active


 


 EKG-ER Only STAT Care  08/02/19 15:51 Active


 


 IV Insertion STAT Care  08/02/19 15:51 Active


 


 CHEST 1 VIEW (PORTABLE) Stat Exams  08/02/19 15:53 Completed


 


 HEAD WITHOUT CONTRAST [CT] Stat Exams  08/02/19 16:57 Ordered


 


 BLOOD CULTURE Stat Lab  08/02/19 16:13 Received


 


 CBC W DIFF Stat Lab  08/02/19 16:00 Completed


 


 CMP Stat Lab  08/02/19 16:00 Completed


 


 Lactic Acid Stat Lab  08/02/19 15:51 Completed


 


 Manual Differential NC Stat Lab  08/02/19 16:00 Completed


 


 NT PRO BNP Stat Lab  08/02/19 16:00 Completed


 


 TROPONIN Q3H Lab  08/02/19 16:00 Completed


 


 TROPONIN Q3H Lab  08/03/19 01:00 Ordered


 


 TROPONIN Q3H Lab  08/03/19 04:00 Ordered








Medication Summary














Discontinued Medications














Generic Name Dose Route Start Last Admin





  Trade Name Freq  PRN Reason Stop Dose Admin


 


Meropenem 1 g/ Sodium Chloride  100 mls @ 200 mls/hr  08/02/19 18:05  08/02/19 

18:12





  IV  08/02/19 18:34  Not Given





  STAT STA   





     





     





     





     


 


Sodium Chloride  1,000 mls @ 999 mls/hr  08/02/19 18:06  08/02/19 18:12





  Sodium Chloride 0.9% 1000 Ml  IV  08/02/19 19:06  Not Given





  .Q1H1M STA   





     





     





     





     


 


Ondansetron HCl  4 mg  08/02/19 18:25  08/02/19 18:28





  Zofran 4 Mg/2 Ml Vial**  IV  08/02/19 18:26  4 mg





  STAT ONE   Administration





     





     





     





     


 


Ondansetron HCl  Confirm  08/02/19 18:27  





  Zofran 4 Mg/2 Ml Vial**  Administered  08/02/19 18:28  





  Dose   





  4 mg   





  .ROUTE   





  .STK-MED ONE   





     





     





     





     


 


Promethazine HCl  12.5 mg  08/02/19 17:10  08/02/19 17:25





  Phenergan 25 Mg Inj***  IM  08/02/19 17:11  12.5 mg





  STAT ONE   Administration





     





     





     





     


 


Promethazine HCl  Confirm  08/02/19 17:11  





  Phenergan 25 Mg Inj***  Administered  08/02/19 17:12  





  Dose   





  25 mg   





  .ROUTE   





  .STK-MED ONE   





     





     





     





     











Lab/Rad Data: 


 Laboratory Result Diagrams





 08/02/19 16:00 





 08/02/19 16:00 





 Laboratory Results











  08/02/19 08/02/19 08/02/19 Range/Units





  17:58 16:00 16:00 


 


WBC     (4.0-10.5)  K/mm3


 


RBC     (4.1-5.6)  M/mm3


 


Hgb     (12.5-18.0)  gm/dl


 


Hct     (42-50)  %


 


MCV     ()  fl


 


MCH     (26-32)  pg


 


MCHC     (32-36)  g/dl


 


RDW     (11.5-14.0)  %


 


Plt Count     (150-450)  K/mm3


 


MPV     (6-9.5)  fl


 


Absolute Granulocytes     (1.4-6.9)  


 


Segmented Neutrophils     (36.-66.)  %


 


Band Neutrophils     (0.0-2.0)  %


 


Lymphocytes (Manual)     (24-44)  %


 


Monocytes (Manual)     (0.0-12.0)  %


 


Eosinophils (Manual)     (0.00-3.0)  %


 


Atypical Lymphocytes     %


 


Hypochromia     


 


Platelet Estimate     (NORMAL)  


 


RBC Morphology     


 


Polychromasia     


 


Anisocytosis     


 


Sodium    136 L  (137-145)  mmol/L


 


Potassium    4.4  (3.5-5.1)  mmol/L


 


Chloride    94 L  ()  mmol/L


 


Carbon Dioxide    31 H  (22-30)  mmol/L


 


Anion Gap    15.6 H  (5-15)  MEQ/L


 


BUN    26 H  (9-20)  mg/dL


 


Creatinine    3.52 H  (0.66-1.25)  mg/dL


 


Estimated GFR    19.8  ML/MIN


 


Glucose    220 H  ()  mg/dL


 


Lactic Acid     (0.4-2.0)  


 


Calcium    9.8  (8.4-10.2)  mg/dL


 


Total Bilirubin    1.00  (0.2-1.3)  mg/dL


 


AST    30  (17-59)  U/L


 


ALT    25  (0-50)  U/L


 


Alkaline Phosphatase    177 H  ()  U/L


 


Ammonia  < 9 L    (9-30)  umol/L


 


Troponin I   0.020   (0.000-0.034)  ng/mL


 


NT-Pro-B Natriuret Pep    7740 H  (0-450)  pg/mL


 


Serum Total Protein    8.8 H  (6.3-8.2)  g/dL


 


Albumin    4.1  (3.5-5.0)  g/dL














  08/02/19 08/02/19 Range/Units





  16:00 15:51 


 


WBC  12.5 H   (4.0-10.5)  K/mm3


 


RBC  4.34   (4.1-5.6)  M/mm3


 


Hgb  11.2 L   (12.5-18.0)  gm/dl


 


Hct  36.1 L   (42-50)  %


 


MCV  83.2   ()  fl


 


MCH  25.8 L   (26-32)  pg


 


MCHC  31.0 L   (32-36)  g/dl


 


RDW  18.7 H   (11.5-14.0)  %


 


Plt Count  453 H   (150-450)  K/mm3


 


MPV  8.1   (6-9.5)  fl


 


Absolute Granulocytes  11.1 H   (1.4-6.9)  


 


Segmented Neutrophils  89 H   (36.-66.)  %


 


Band Neutrophils  1   (0.0-2.0)  %


 


Lymphocytes (Manual)  3 L   (24-44)  %


 


Monocytes (Manual)  5   (0.0-12.0)  %


 


Eosinophils (Manual)  1   (0.00-3.0)  %


 


Atypical Lymphocytes  1   %


 


Hypochromia  1+   


 


Platelet Estimate  INCREASED   (NORMAL)  


 


RBC Morphology  ABNORMAL   


 


Polychromasia  RARE   


 


Anisocytosis  2+   


 


Sodium    (137-145)  mmol/L


 


Potassium    (3.5-5.1)  mmol/L


 


Chloride    ()  mmol/L


 


Carbon Dioxide    (22-30)  mmol/L


 


Anion Gap    (5-15)  MEQ/L


 


BUN    (9-20)  mg/dL


 


Creatinine    (0.66-1.25)  mg/dL


 


Estimated GFR    ML/MIN


 


Glucose    ()  mg/dL


 


Lactic Acid   1.6  (0.4-2.0)  


 


Calcium    (8.4-10.2)  mg/dL


 


Total Bilirubin    (0.2-1.3)  mg/dL


 


AST    (17-59)  U/L


 


ALT    (0-50)  U/L


 


Alkaline Phosphatase    ()  U/L


 


Ammonia    (9-30)  umol/L


 


Troponin I    (0.000-0.034)  ng/mL


 


NT-Pro-B Natriuret Pep    (0-450)  pg/mL


 


Serum Total Protein    (6.3-8.2)  g/dL


 


Albumin    (3.5-5.0)  g/dL














- Progress


Progress: improved, re-examined


Progress Note: 





08/02/19 18:37


pt a bit more interactive. attempted earlier to perform ct head but pt could 

not tolerate. no n/v but dry heaves. will attempt again since he is more alert 

and awake. pt and mother prefer he go home if ct head not acute. i told them 

lets wait and see what is found. 


08/02/19 19:22


pt is feeling better. refusing ct head. mom states pt told her same. pt does 

not want admission or transfer at this time. mom states pt has had problems 

undergoing ct head in past with same sx. pt to sign refusal of care forms.


08/02/19 19:24





Counseled pt/family regarding: lab results, diagnosis, need for follow-up, rad 

results





- Departure


Departure Disposition: Home, AMA


Clinical Impression: 


 Weakness, Nausea & vomiting





Condition: Stable


Critical Care Time: No


Referrals: 


VICTORIA WADE MD [NON-STAFF PHY W/O PRIVILEGES] - 


Additional Instructions: 


take medications as prescribed. except, check your blood sugar levels and make 

sure you are tolerating your renal and diabetic diet before giving yourself 

insulin. return to ED if symptoms worsen. follow up with primary doctor and 

nephrologist for further management

## 2019-08-02 NOTE — XRAY
Indication: Hypoxia and weakness.



Comparison: April 17, 2019.



Portable chest demonstrates new right large bore dialysis catheter without

complications.  Remaining heart and lungs normal.  Bony thorax intact.

## 2019-10-21 ENCOUNTER — HOSPITAL ENCOUNTER (OUTPATIENT)
Dept: HOSPITAL 33 - SDC | Age: 49
Discharge: HOME | End: 2019-10-21
Attending: SURGERY
Payer: COMMERCIAL

## 2019-10-21 VITALS — OXYGEN SATURATION: 91 %

## 2019-10-21 VITALS — HEART RATE: 81 BPM | DIASTOLIC BLOOD PRESSURE: 82 MMHG | SYSTOLIC BLOOD PRESSURE: 151 MMHG

## 2019-10-21 DIAGNOSIS — E66.9: ICD-10-CM

## 2019-10-21 DIAGNOSIS — Z79.01: ICD-10-CM

## 2019-10-21 DIAGNOSIS — E11.9: ICD-10-CM

## 2019-10-21 DIAGNOSIS — I10: ICD-10-CM

## 2019-10-21 DIAGNOSIS — Z79.899: ICD-10-CM

## 2019-10-21 DIAGNOSIS — N19: Primary | ICD-10-CM

## 2019-10-21 LAB
ALBUMIN SERPL-MCNC: 4.2 G/DL (ref 3.5–5)
ALP SERPL-CCNC: 105 U/L (ref 38–126)
ALT SERPL-CCNC: 27 U/L (ref 0–50)
ANION GAP SERPL CALC-SCNC: 16.3 MEQ/L (ref 5–15)
AST SERPL QL: 31 U/L (ref 17–59)
BILIRUB BLD-MCNC: 0.8 MG/DL (ref 0.2–1.3)
BUN SERPL-MCNC: 30 MG/DL (ref 9–20)
CALCIUM SPEC-MCNC: 9.3 MG/DL (ref 8.4–10.2)
CHLORIDE SERPL-SCNC: 96 MMOL/L (ref 98–107)
CO2 SERPL-SCNC: 29 MMOL/L (ref 22–30)
CREAT SERPL-MCNC: 3.95 MG/DL (ref 0.66–1.25)
GLUCOSE SERPL-MCNC: 195 MG/DL (ref 74–106)
HCT VFR BLD AUTO: 35.7 % (ref 42–50)
HGB BLD-MCNC: 11.7 GM/DL (ref 12.5–18)
MCH RBC QN AUTO: 29.1 PG (ref 26–32)
MCHC RBC AUTO-ENTMCNC: 32.8 G/DL (ref 32–36)
PLATELET # BLD AUTO: 235 K/MM3 (ref 150–450)
POTASSIUM SERPLBLD-SCNC: 4.4 MMOL/L (ref 3.5–5.1)
PROT SERPL-MCNC: 8 G/DL (ref 6.3–8.2)
RBC # BLD AUTO: 4.02 M/MM3 (ref 4.1–5.6)
SODIUM SERPL-SCNC: 137 MMOL/L (ref 137–145)
WBC # BLD AUTO: 10.6 K/MM3 (ref 4–10.5)

## 2019-10-21 PROCEDURE — 82962 GLUCOSE BLOOD TEST: CPT

## 2019-10-21 PROCEDURE — 85027 COMPLETE CBC AUTOMATED: CPT

## 2019-10-21 PROCEDURE — 36415 COLL VENOUS BLD VENIPUNCTURE: CPT

## 2019-10-21 PROCEDURE — 80053 COMPREHEN METABOLIC PANEL: CPT

## 2019-10-21 NOTE — HP
DATE OF SURGERY:   10/21/2019



HISTORY OF PRESENT ILLNESS:  The patient is a 49 year-old with some renal failure 
requesting peritoneal dialysis access.   



PAST MEDICAL HISTORY:  Obesity, diabetes, hypertension. 



PAST SURGICAL HISTORY:  He had left fifth toe removed and right foot removed in the past. 
He had diabetic foot disease. 



MEDICATIONS:  Humalog, Labetalol, Eliquis, gabapentin, Auryxia, clonidine. 



ALLERGIES:  NKDA.

  

FAMILY HISTORY:  Diabetes, hypertension. 



SOCIAL HISTORY:  He denies smoking or alcohol abuse. 



REVIEW OF SYSTEMS:  Fourteen systems reviewed pertinent for multiple medical problems 
noted above and renal failure.  No chest pain or palpitations currently.  



PHYSICAL EXAMINATION:  

GENERAL:  A chronically ill gentleman in no acute distress.

HEENT: Sclerae nonicteric. 

CHEST: Equal excursion, nonlabored breathing. 

CVS:  Regular rate and rhythm. 

ABDOMEN:  Soft, obese. No peritoneal signs.  

EXTREMITIES: Left fifth toe removed, right foot removed in the past.

NEURO:  Alert, oriented, moving extremities grossly symmetrically.  



IMPRESSION: Renal failure, diabetes, hypertension. He is in need of peritoneal dialysis 
access. I feel he is a candidate. He was shown the risk sheet, explained the procedure in 
detail including bleeding or infection, risk of bowel injury or perforation possibly 
requiring further procedure, risk of trocar injury or hernia, risk of bowel, blood vessel, 
bladder injury possibly requiring other procedures. He understands possibility of needing 
an open procedure, possibility of scar formation or adhesions up against the catheter and 
sometimes up to half the patients sometimes the peritoneal catheter does not function well 
enough to work for long term dialysis. He understands as well as the overall risk of 
catheter infection or peritonitis possibly requiring removal down the road, general risk 
of adhesions or obstruction, general risk of aches and pains but not limited to. He 
understands as well as general risk of anesthesia, deep venous thrombosis, pulmonary 
embolism but not limited to, will proceed with laparoscopic peritoneal dialysis catheter 
placement possible open as an outpatient.

## 2019-10-22 NOTE — OP
SURGERY DATE/TIME:   10/21/2019  1220     



PREOPERATIVE DIAGNOSIS:    Renal failure, need for peritoneal dialysis access. 



POSTOPERATIVE DIAGNOSIS:  Renal failure, need for peritoneal dialysis access. 



PROCEDURE:    Laparoscopic peritoneal dialysis catheter placement. 



SURGEON:        Dr. Jeffreson Frye.



ANESTHESIA:    General. 



ESTIMATED BLOOD LOSS:    Minimal.    



INDICATIONS:  As noted above. Risks and benefits explained in detail and not limited to 
and consent obtained.

     

DESCRIPTION OF PROCEDURE AND FINDINGS: The patient is taken to the operating room. General 
anesthesia induced. Abdomen prepped and draped in usual sterile fashion. After official 
time out and no disagreement with planned procedure, a transverse incision made 
supraumbilical area. Fascia grasped and pulled upward. Veress needle tested with saline. 
Pneumoperitoneum accomplished opening pressure 0 to 15.  A 5 mm bladeless port and camera 
inserted without difficulty followed by 5 mm left mid abdomen port and an 8 mm lower 
quadrant port angled towards the pelvis. He had a lot of adipose tissue but it was felt he 
had enough space to try putting the catheter down in the pelvis. The peritoneal catheter 
was carefully placed through the port and directed down towards behind the bladder, in 
front of the viscera in the pelvis area. The internal cuff was at the level of the fascia. 
The external was on the subcu. Given his obesity the catheter was tunneled out a little 
bit more laterally. Secured away from the exit site with Prolene suture with Bacitracin 
and sterile dressing applied. The Titanium connector was attached. The catheter flushed 
easily with the injectable saline, clamp secured and a cap placed on it for the peritoneal 
dialysis catheter as a request. Sterile dressing applied. Patient tolerated the procedure 
well. There were no immediate complications. Skin incision closed with 4-0 Vicryl. 0.25% 
Marcaine local injected along the skin incision fascial defect at the beginning of the 
procedure. There were no immediate complications. There was no family available to discuss 
the findings with at this time.

## 2021-07-08 ENCOUNTER — HOSPITAL ENCOUNTER (OUTPATIENT)
Dept: HOSPITAL 33 - MED SURG | Age: 51
Setting detail: OBSERVATION
LOS: 1 days | Discharge: HOME | End: 2021-07-09
Attending: FAMILY MEDICINE | Admitting: FAMILY MEDICINE
Payer: COMMERCIAL

## 2021-07-08 DIAGNOSIS — E11.621: Primary | ICD-10-CM

## 2021-07-08 DIAGNOSIS — Z89.511: ICD-10-CM

## 2021-07-08 DIAGNOSIS — E11.22: ICD-10-CM

## 2021-07-08 DIAGNOSIS — E11.65: ICD-10-CM

## 2021-07-08 DIAGNOSIS — Z79.899: ICD-10-CM

## 2021-07-08 DIAGNOSIS — I12.9: ICD-10-CM

## 2021-07-08 DIAGNOSIS — Z20.828: ICD-10-CM

## 2021-07-08 DIAGNOSIS — E11.610: ICD-10-CM

## 2021-07-08 DIAGNOSIS — N18.5: ICD-10-CM

## 2021-07-08 LAB
ALBUMIN SERPL-MCNC: 4.1 G/DL (ref 3.5–5)
ALP SERPL-CCNC: 126 U/L (ref 38–126)
ALT SERPL-CCNC: 28 U/L (ref 0–50)
ANION GAP SERPL CALC-SCNC: 18.7 MEQ/L (ref 5–15)
AST SERPL QL: 29 U/L (ref 17–59)
BASOPHILS # BLD AUTO: 0.04 10*3/UL (ref 0–0.4)
BASOPHILS NFR BLD AUTO: 0.3 % (ref 0–0.4)
BILIRUB BLD-MCNC: 0.9 MG/DL (ref 0.2–1.3)
BUN SERPL-MCNC: 32 MG/DL (ref 9–20)
CALCIUM SPEC-MCNC: 8.7 MG/DL (ref 8.4–10.2)
CHLORIDE SERPL-SCNC: 87 MMOL/L (ref 98–107)
CO2 SERPL-SCNC: 31 MMOL/L (ref 22–30)
CREAT SERPL-MCNC: 5.73 MG/DL (ref 0.66–1.25)
EOSINOPHIL # BLD AUTO: 0.28 10*3/UL (ref 0–0.5)
GFR SERPLBLD BASED ON 1.73 SQ M-ARVRAT: 11.2 ML/MIN
GLUCOSE SERPL-MCNC: 310 MG/DL (ref 74–106)
HCT VFR BLD AUTO: 34.3 % (ref 42–50)
HGB BLD-MCNC: 10.6 GM/DL (ref 12.5–18)
LYMPHOCYTES # SPEC AUTO: 1.08 10*3/UL (ref 1–4.6)
MCH RBC QN AUTO: 30.2 PG (ref 26–32)
MCHC RBC AUTO-ENTMCNC: 30.9 G/DL (ref 32–36)
MONOCYTES # BLD AUTO: 1.2 10*3/UL (ref 0–1.3)
PLATELET # BLD AUTO: 331 K/MM3 (ref 150–450)
POTASSIUM SERPLBLD-SCNC: 5.1 MMOL/L (ref 3.5–5.1)
PROT SERPL-MCNC: 8 G/DL (ref 6.3–8.2)
RBC # BLD AUTO: 3.51 M/MM3 (ref 4.1–5.6)
SODIUM SERPL-SCNC: 132 MMOL/L (ref 137–145)
WBC # BLD AUTO: 14.3 K/MM3 (ref 4–10.5)

## 2021-07-08 PROCEDURE — 11044 DBRDMT BONE 1ST 20 SQ CM/<: CPT

## 2021-07-08 PROCEDURE — 86141 C-REACTIVE PROTEIN HS: CPT

## 2021-07-08 PROCEDURE — 82947 ASSAY GLUCOSE BLOOD QUANT: CPT

## 2021-07-08 PROCEDURE — 83036 HEMOGLOBIN GLYCOSYLATED A1C: CPT

## 2021-07-08 PROCEDURE — 36415 COLL VENOUS BLD VENIPUNCTURE: CPT

## 2021-07-08 PROCEDURE — 20700 MNL PREP&INSJ DP RX DLVR DEV: CPT

## 2021-07-08 PROCEDURE — 97161 PT EVAL LOW COMPLEX 20 MIN: CPT

## 2021-07-08 PROCEDURE — 88304 TISSUE EXAM BY PATHOLOGIST: CPT

## 2021-07-08 PROCEDURE — 85652 RBC SED RATE AUTOMATED: CPT

## 2021-07-08 PROCEDURE — G0378 HOSPITAL OBSERVATION PER HR: HCPCS

## 2021-07-08 PROCEDURE — 85025 COMPLETE CBC W/AUTO DIFF WBC: CPT

## 2021-07-08 PROCEDURE — 80053 COMPREHEN METABOLIC PANEL: CPT

## 2021-07-08 PROCEDURE — U0003 INFECTIOUS AGENT DETECTION BY NUCLEIC ACID (DNA OR RNA); SEVERE ACUTE RESPIRATORY SYNDROME CORONAVIRUS 2 (SARS-COV-2) (CORONAVIRUS DISEASE [COVID-19]), AMPLIFIED PROBE TECHNIQUE, MAKING USE OF HIGH THROUGHPUT TECHNOLOGIES AS DESCRIBED BY CMS-2020-01-R: HCPCS

## 2021-07-08 RX ADMIN — INSULIN LISPRO PRN UNIT: 100 INJECTION, SOLUTION INTRAVENOUS; SUBCUTANEOUS at 21:15

## 2021-07-08 RX ADMIN — CLONIDINE HYDROCHLORIDE SCH MG: 0.1 TABLET ORAL at 21:15

## 2021-07-08 RX ADMIN — CEFEPIME HYDROCHLORIDE SCH MLS/HR: 2 INJECTION, POWDER, FOR SOLUTION INTRAVENOUS at 18:15

## 2021-07-08 RX ADMIN — INSULIN LISPRO PRN UNIT: 100 INJECTION, SOLUTION INTRAVENOUS; SUBCUTANEOUS at 18:14

## 2021-07-08 RX ADMIN — GABAPENTIN SCH MG: 300 CAPSULE ORAL at 21:13

## 2021-07-08 RX ADMIN — LABETALOL HCL SCH MG: 100 TABLET, FILM COATED ORAL at 21:14

## 2021-07-08 RX ADMIN — HYDROCHLOROTHIAZIDE SCH MG: 25 TABLET ORAL at 21:14

## 2021-07-08 NOTE — PCM.CONS
Podiatry HPI





- Consult


Date of Consultation Date: 07/08/21


Reason for Consult: DFU, pressure ulceration to level of tendon, Anterior ankle 

ulceration left ankle


Consulting Provider: 


ABDIEL CONRAD DPM








- \[facility] [place] [facility]\""


History of Present Illness: 


Enoch is a very pleasant 51-year-old male who presents today for follow-up of a 

left anterior Ankle wound. He does indicate that his mother has been taking care

of the wound since the last time he has been seen. He indicates that physical 

therapy discharged him due to his insurance coverage being expanded. At this 

time on removal of the dressings tendon is once again exposed and the wound is 

approximately 2 cm larger in each direction. Patient indicates that he felt as 

though the wound was getting better. He currently denies any constitutional 

symptoms of infection. He denies any other pedal complaints at this time.








Medications & Allergies


Home Medications: 


                              Home Medication List





Clonidine HCl 0.1 mg PO TID 04/17/19 [History Confirmed 10/21/19]


Gabapentin 600 mg PO BID 04/17/19 [History Confirmed 10/21/19]


Insulin Glargine,Hum.rec.anlog [Lantus Solostar] 25 units SQ QHS 04/17/19 

[History Confirmed 10/21/19]


Ferric Citrate [Auryxia] 2 tab PO UD 10/09/19 [History Confirmed 10/21/19]


Insulin Lispro [Humalog] 5 unit SQ UD 10/09/19 [History Confirmed 10/21/19]


Labetalol HCl [Trandate] 200 mg PO BID 10/09/19 [History Confirmed 10/21/19]


Apixaban [Eliquis] 5 mg PO DAILY #0 10/21/19 [Rx Confirmed 10/21/19]


Promethazine HCl 25 mg*** [Phenergan 25 mg***] 12.5 mg PO Q6HPRN PRN #10 tablet 

10/21/19 [Rx]


Tramadol HCl 50 mg*** [Ultram 50 mg***] 50 mg PO Q6H PRN PRN #15 tablet 10/21/19

[Rx]








Allergies/Adverse Reactions: 


                                    Allergies











Allergy/AdvReac Type Severity Reaction Status Date / Time


 


No Known Drug Allergies Allergy   Verified 10/21/19 10:44














- Past Medical History


Past Medical History: Yes


Neurological History: Peripheral Neuropathy


ENT History: No Pertinent History


Cardiac History: Hypertension


Respiratory History: No Pertinent History


Endocrine Medical History: Diabetes Type II, Other


Musculoskelatal History: Other


GI Medical History: No Pertinent History


 History: Dialysis, Renal Disease, Other


Pyscho-Social History: No Pertinent History


Male Reproductive Disorders: No Pertinent History


Comment: CKD STAGE 5 WITH DIALYSIS , R BKA





- Past Surgical History


Past Surgical History: Yes


Neuro Surgical History: No Pertinent History


Cardiac History: No Pertinent History


Respiratory Surgery: No Pertinent History


GI Surgical History: No Pertinent History


Genitourinary Surgical Hx: No Pertinent History


Musculskeletal Surgical Hx: Amputation


Male Surgical History: No Pertinent History


Other Surgical History: permacath placed right chest (removed).  may 2019 right 

below the knee amputation.  AV fistula Left upper arm.  L little toe removed 5 

yr ago





- Social History


Smoking Status: Never smoker


Exposure to second hand smoke: Yes


Alcohol: Rarely


Drug Use: none


Significant Family History: no pertinent family hx





Physical Exam





- Narrative


Narrative Physical Exam: 


Podiatry Physical Exam





Vascular: DP and PT pulses palpable left. CFT <5 seconds.  Skin temperature warm

 to cool from the proximal tibial tuberosity to distal toes . Normal pedal hair 

growth. Varicosities noted to lower leg. No cellulitis, proximal streaking or 

lymphangitis noted. No lymphadenopathy on palpation of the popliteal or inguinal

 lymph nodes 








Neurological: Protective sensation diminished 0/10 on the right and 0/10 on the 

left as indicated with Quincy-Manuel 5.07 monofilament. Lower extremity 

temperature sensation diminished. Evidence of intrinsic muscle atrophy.





Dermatological: Trophic changes to the skin. Skin is xerotic and scaly in 

nature. Turgor is rigid. No cicatrix noted. Webspaces are clean, dry and intact 

b/l.





Musculoskeletal: Strength intact to the left lower extremity for all muscle 

groups. Rectus foot architecture noted. Normal range of motion to all pedal 

joints. There is minimal pain on palpation of the periwound area. Antalgic gait 

and jennifer secondary to BKA to the right lower extremity. Patient is ambulating

 in a pair of athletic shoes. Prosthetic device to right foot








Wound Care / Suspicious Lesions: 





Wound #1





Location Left anterior ankle





Size -6 cm x 4.3 cm x 0.5 cm





Appearance - 90% granular 10% fibrotic





S/S of infection - Negative undermining. Negative probe to bone





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 Unable to assess if exposed nerves

 or veins. Significant fibrotic tissue. Significant edema noted. Significant 

drainage serous in nature.











Results





- Labs


Lab/Micro Results: 





                            Lab Results-Last 24 Hours











  07/08/21 07/08/21 07/08/21 Range/Units





  16:10 16:10 16:33 


 


WBC  14.3 H    (4.0-10.5)  K/mm3


 


RBC  3.51 L    (4.1-5.6)  M/mm3


 


Hgb  10.6 L    (12.5-18.0)  gm/dl


 


Hct  34.3 L    (42-50)  %


 


MCV  97.7    ()  fl


 


MCH  30.2    (26-32)  pg


 


MCHC  30.9 L    (32-36)  g/dl


 


RDW  15.8 H    (11.5-14.0)  %


 


Plt Count  331    (150-450)  K/mm3


 


MPV  8.3    (7.5-11.0)  fl


 


Gran %  81.8 H    (36.0-66.0)  %


 


Eos # (Auto)  0.28    (0-0.5)  


 


Absolute Lymphs (auto)  1.08    (1.0-4.6)  


 


Absolute Monos (auto)  1.20    (0.0-1.3)  


 


Lymphocytes %  7.5 L    (24.0-44.0)  %


 


Monocytes %  8.4    (0.0-12.0)  %


 


Eosinophils %  2.0    (0.00-5.0)  %


 


Basophils %  0.3    (0.0-0.4)  %


 


Absolute Granulocytes  11.71 H    (1.4-6.9)  


 


Basophils #  0.04    (0-0.4)  


 


ESR  Pending    


 


Sodium   132 L   (137-145)  mmol/L


 


Potassium   5.1   (3.5-5.1)  mmol/L


 


Chloride   87 L   ()  mmol/L


 


Carbon Dioxide   31 H   (22-30)  mmol/L


 


Anion Gap   18.7 H   (5-15)  MEQ/L


 


BUN   32 H   (9-20)  mg/dL


 


Creatinine   5.73 H   (0.66-1.25)  mg/dL


 


Estimated GFR   11.2   ML/MIN


 


Glucose   310 H   ()  mg/dL


 


POC Glucometer    268 H  (74 to 106)  mg/dL


 


Calcium   8.7   (8.4-10.2)  mg/dL


 


Total Bilirubin   0.90   (0.2-1.3)  mg/dL


 


AST   29   (17-59)  U/L


 


ALT   28   (0-50)  U/L


 


Alkaline Phosphatase   126   ()  U/L


 


Serum Total Protein   8.0   (6.3-8.2)  g/dL


 


Albumin   4.1   (3.5-5.0)  g/dL














Assessment/Plan


(1) Type II diabetes mellitus, well controlled


Current Visit: No   Status: Acute   Code(s): E11.9 - TYPE 2 DIABETES MELLITUS 

WITHOUT COMPLICATIONS   





(2) Charcot foot due to diabetes mellitus


Current Visit: No   Status: Acute   Code(s): E11.610 - TYPE 2 DIABETES MELLITUS 

W DIABETIC NEUROPATHIC ARTHROPATHY   





(3) Diabetic foot ulcer associated with type 2 diabetes mellitus


Current Visit: No   Status: Acute   Code(s): E11.621 - TYPE 2 DIABETES MELLITUS 

WITH FOOT ULCER; L97.509 - NON-PRESSURE CHRONIC ULCER OTH PRT UNSP FOOT W UNSP 

SEVERITY   





(4) Foot ulcer


Current Visit: No   Status: Acute   


Assessment & Plan: 


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 

tendon and would benefit from surgical debridement of the necrotic and fibrotic 

tissue at the wound base as well as application of a synthetic skin substitute 

in order to obtain closure over the exposed tendon. Though patient is reluctant 

he understands that it is only a matter of time before an infection begins to 

invade the wound and travel up the tendon sheath resulting in a significantly 

more debilitating infection and possibly surgery. 


Dr. So is willing to admit for observation at this time. 


Patient does not need to be n.p.o. prior to surgical intervention as he is 

neuropathic and we will proceed with a local injection in order to block wound 

debridement. 


Tentative surgical date will be for July 9, 2021. Consent to read: "Incision 

drainage and soft tissue debridement to level of tendon, pulse lavage, 

application of synthetic skin substitute."."." 


CBC with differential, CMP, ESR, CRP, HbA1c, lactic acid 


PICC line placement 


We will hold on antibiotics at this time until CMP has been obtained. 


Nonweightbearing to the left lower extremity full weightbearing to prosthesis to

 right with assistive device. Cam walker to the left lower extremity following s

urgical intervention tomorrow. 


Patient will be planned for discharge to nursing facility. 


PT OT consult treat and eval. 


Social service consult for discharge preferred SNF


Code(s): L97.509 - NON-PRESSURE CHRONIC ULCER OTH PRT UNSP FOOT W UNSP SEVERITY

## 2021-07-09 VITALS — HEART RATE: 86 BPM | DIASTOLIC BLOOD PRESSURE: 57 MMHG | SYSTOLIC BLOOD PRESSURE: 123 MMHG | OXYGEN SATURATION: 90 %

## 2021-07-09 RX ADMIN — GABAPENTIN SCH: 300 CAPSULE ORAL at 09:30

## 2021-07-09 RX ADMIN — LABETALOL HCL SCH MG: 100 TABLET, FILM COATED ORAL at 09:30

## 2021-07-09 RX ADMIN — INSULIN LISPRO PRN UNIT: 100 INJECTION, SOLUTION INTRAVENOUS; SUBCUTANEOUS at 12:10

## 2021-07-09 RX ADMIN — HYDROCHLOROTHIAZIDE SCH MG: 25 TABLET ORAL at 09:30

## 2021-07-09 RX ADMIN — CLONIDINE HYDROCHLORIDE SCH MG: 0.1 TABLET ORAL at 09:31

## 2021-07-09 RX ADMIN — CEFEPIME HYDROCHLORIDE SCH MLS/HR: 2 INJECTION, POWDER, FOR SOLUTION INTRAVENOUS at 06:31

## 2021-07-09 NOTE — OP
SURGERY DATE/TIME:    07/08/2021  0749



PREOPERATIVE DIAGNOSIS:      Diabetic foot infection, chronic kidney disease, exposed 
extensor hallucis longus, below knee amputation to contralateral extremity.



POSTOPERATIVE DIAGNOSIS:     Diabetic foot infection, chronic kidney disease, exposed 
extensor hallucis longus, below knee amputation to contralateral extremity.



PROCEDURE:   Incision and drainage with debridement to level of tendon and bone, pulse 
lavage and application of antibiotic beads left ankle. 



SURGEON:    Mark Anthony Bowman DPM. 



ASSISTANT:  None.     



HEMOSTASIS:  Pressure dressing. 



ESTIMATED BLOOD LOSS:   Less than 10 cc. 



MATERIALS:  1,000 ml of Bactisure. Calcigen S antibiotic beads with 160 mg of tobramycin. 



INJECTABLES:  20 cc of 1:1 mixture of 1% lidocaine plain and 0.5% bupivacaine plain 
injected in an ankle block-type fashion. 



INDICATION FOR SURGERY:  Enoch is a very pleasant 51 year-old male who was seen multiple 
times in my office over the course of the last several months. The patient did have a 
below knee amputation to right lower extremity secondary to diabetes and Charcot. On the 
left he is developing an ulceration to the anterior aspect of the left ankle. At certain 
points in his treatment he did have exposed tendon which the patient was resistant to and 
followed with surgical intervention. At this time the patient presents with a significant 
amount of his tendon exposed as well as infection and the patient is willing to proceed 
with surgical intervention at this time as an effort for limb salvage.  The patient 
understands all risks, benefits and complications of surgical intervention at this time 
including but not limited to recurrent infection, possibility of need for repeat 
intervention, continued infection and possible loss of limb and loss of life as a result 
of this infection. The patient understands this and wishes to proceed at this time. 

   

DESCRIPTION OF PROCEDURE AND FINDINGS:  The patient was brought into the OR and left on 
the cart in supine position at this time. A Betadine prep was applied to the left lower 
extremity and the patient was prepped and draped in usual sterile fashion. At this time a 
20 cc block of 1:1 mixture of 0.5% bupivacaine and 1% lidocaine was injected in ankle 
block-type fashion. At this time attention was directed to the wound where all necrotic, 
devitalized and infected tissue was debrided utilizing a combination of rongeur, curettes 
and sharp dissection. Careful dissection was made to insure that there were no 
neurovascular injury in this area with particular interest to the deep peroneal nerve and 
the dorsalis pedis artery. At this time adequate incision and debridement was maintained 
the tendon sheath was incised and deemed to be adequate for healing and free of any 
infection tracking proximally and distally. The bone over the dorsal aspect of the foot 
medial and lateral to the EHL tendon appeard devitalized and necrotic therefore 
debridement took plate utilizing a currett to the surface of the bone. At this time 3 
liters of sterile saline were utilized in a pulse lavage-type fashion in order to 
eliminate any of the bacterial biorhythm. A 1,000 ml of Bactisure was then utilized to 
cleanse the wound and once again sterilize cleansed with 1 liter of sterile saline. At 
this time the Calcigen S antibiotic beads were applied with 160 mg of tobramycin to the 
dorsal aspect of the wound secured with Adaptic, 4x4, Kerlix, a 4 inch and 6 inch ACE.  
The patient handled the procedure without complication and was returned to the floor. 
Orders as indicated in the patients chart.

## 2021-07-09 NOTE — PCM.SSS
History of Present Illness





- Chief Complaint


Chief Complaint: Diabetic left foot ulcer, DM


History of Present Illness: 


 is a 51 year old male with hx of right BKA, has an open ulcer with 

exposed tendon to left lower ankle, went to surgery with Dr Hopson this am for 

bead placement and debridement, needs to go to dialysis this afternoon and will 

f/u next week with podiatry, has no need for pain meds, no further oral or IV 

abx required due to abx beads.








- Review of Systems


Constitutional: No Fever, No Chills


Respiratory: No Cough, No Short Of Breath


Cardiac: No Chest Pain, No Edema, No Syncope


Abdominal/Gastrointestinal: No Abdominal Pain, No Nausea, No Vomiting, No 

Diarrhea


All Other Systems: Reviewed and Negative





Medications & Allergies


Home Medications: 


                              Home Medication List





Clonidine HCl 0.1 mg PO BID 04/17/19 [History Confirmed 07/08/21]


Gabapentin 600 mg PO BID 04/17/19 [History Confirmed 07/08/21]


Insulin Lispro [Humalog] 0 unit SQ UD 10/09/19 [History Confirmed 07/08/21]


Labetalol HCl [Trandate] 200 mg PO BID 10/09/19 [History Confirmed 07/08/21]


Calcitriol 0.5 mcg PO BID 07/08/21 [History Confirmed 07/08/21]


Insulin Detemir [Levemir] 60 unit SQ HS 07/08/21 [History Confirmed 07/08/21]


Lisinopril 40 mg PO BID 07/08/21 [History Confirmed 07/08/21]


PANTOPRAZOLE 40 mg Tablet*** [Protonix 40MG Tablet***] 40 mg PO DAILY 07/08/21 

[History Confirmed 07/08/21]








Allergies/Adverse Reactions: 


                                    Allergies











Allergy/AdvReac Type Severity Reaction Status Date / Time


 


No Known Drug Allergies Allergy   Verified 10/21/19 10:44














- Past Medical History


Past Medical History: Yes


Neurological History: Peripheral Neuropathy


ENT History: No Pertinent History


Cardiac History: Hypertension


Respiratory History: No Pertinent History


Endocrine Medical History: Diabetes Type II, Other


Musculoskelatal History: Other


GI Medical History: No Pertinent History


 History: Dialysis, Renal Disease, Other


Pyscho-Social History: No Pertinent History


Male Reproductive Disorders: No Pertinent History


Comment: CKD STAGE 5 WITH DIALYSIS , R BKA





- Past Surgical History


Past Surgical History: Yes


Neuro Surgical History: No Pertinent History


Cardiac History: No Pertinent History


Respiratory Surgery: No Pertinent History


GI Surgical History: No Pertinent History


Genitourinary Surgical Hx: No Pertinent History


Musculskeletal Surgical Hx: Amputation


Male Surgical History: No Pertinent History


Other Surgical History: permacath placed right chest (removed).  may 2019 right 

below the knee amputation.  AV fistula Left upper arm.  L little toe removed 5 

yr ago





- Social History


Smoking Status: Never smoker


Exposure to second hand smoke: Yes


Alcohol: Rarely


Drug Use: none


Significant Family History: no pertinent family hx





- Physical Exam


Vital Signs: 


                               Vital Signs - 24 hr











  Temp Pulse Resp BP Pulse Ox


 


 07/09/21 08:00  99.5 F  83  18  130/59  91 L


 


 07/09/21 04:00  98.4 F  76  20  145/66  93 L


 


 07/09/21 01:46  98.6 F  75  18  141/65  96


 


 07/08/21 23:53  98.6 F  75  18  141/65  96


 


 07/08/21 20:00  99.0 F  80  20  145/64  96


 


 07/08/21 16:49  98.8 F  74  18  150/70  94 L


 


 07/08/21 15:45  98.8 F  74  18  150/70  94 L











General Appearance: no apparent distress, obese


Neurologic Exam: alert, oriented x 3, cooperative


Respiratory Exam: normal breath sounds, lungs clear, No respiratory distress


Cardiovascular Exam: regular rate/rhythm, normal heart sounds, normal peripheral

pulses


Gastrointestinal/Abdomen Exam: soft, normal bowel sounds, No tenderness, No mass


Extremity Exam: other (right BKA with prosthesis, boot on left lower extremity, 

dressing intact)





Results





- Labs


Lab/Micro Results: 


                            Lab Results-Last 24 Hours











  07/08/21 07/08/21 07/08/21 Range/Units





  15:51 16:10 16:10 


 


WBC   14.3 H   (4.0-10.5)  K/mm3


 


RBC   3.51 L   (4.1-5.6)  M/mm3


 


Hgb   10.6 L   (12.5-18.0)  gm/dl


 


Hct   34.3 L   (42-50)  %


 


MCV   97.7   ()  fl


 


MCH   30.2   (26-32)  pg


 


MCHC   30.9 L   (32-36)  g/dl


 


RDW   15.8 H   (11.5-14.0)  %


 


Plt Count   331   (150-450)  K/mm3


 


MPV   8.3   (7.5-11.0)  fl


 


Gran %   81.8 H   (36.0-66.0)  %


 


Eos # (Auto)   0.28   (0-0.5)  


 


Absolute Lymphs (auto)   1.08   (1.0-4.6)  


 


Absolute Monos (auto)   1.20   (0.0-1.3)  


 


Lymphocytes %   7.5 L   (24.0-44.0)  %


 


Monocytes %   8.4   (0.0-12.0)  %


 


Eosinophils %   2.0   (0.00-5.0)  %


 


Basophils %   0.3   (0.0-0.4)  %


 


Absolute Granulocytes   11.71 H   (1.4-6.9)  


 


Basophils #   0.04   (0-0.4)  


 


ESR   129 H   (0-15)  mm/hr


 


Sodium    132 L  (137-145)  mmol/L


 


Potassium    5.1  (3.5-5.1)  mmol/L


 


Chloride    87 L  ()  mmol/L


 


Carbon Dioxide    31 H  (22-30)  mmol/L


 


Anion Gap    18.7 H  (5-15)  MEQ/L


 


BUN    32 H  (9-20)  mg/dL


 


Creatinine    5.73 H  (0.66-1.25)  mg/dL


 


Estimated GFR    11.2  ML/MIN


 


Glucose    310 H  ()  mg/dL


 


POC Glucometer     (74 to 106)  mg/dL


 


Hemoglobin A1c     (4.5-6.0)  %


 


Calcium    8.7  (8.4-10.2)  mg/dL


 


Total Bilirubin    0.90  (0.2-1.3)  mg/dL


 


AST    29  (17-59)  U/L


 


ALT    28  (0-50)  U/L


 


Alkaline Phosphatase    126  ()  U/L


 


Serum Total Protein    8.0  (6.3-8.2)  g/dL


 


Albumin    4.1  (3.5-5.0)  g/dL


 


SARS-CoV-2 (PCR)  NEGATIVE    (NEGATIVE)  














  07/08/21 07/08/21 07/08/21 Range/Units





  16:33 16:50 20:57 


 


WBC     (4.0-10.5)  K/mm3


 


RBC     (4.1-5.6)  M/mm3


 


Hgb     (12.5-18.0)  gm/dl


 


Hct     (42-50)  %


 


MCV     ()  fl


 


MCH     (26-32)  pg


 


MCHC     (32-36)  g/dl


 


RDW     (11.5-14.0)  %


 


Plt Count     (150-450)  K/mm3


 


MPV     (7.5-11.0)  fl


 


Gran %     (36.0-66.0)  %


 


Eos # (Auto)     (0-0.5)  


 


Absolute Lymphs (auto)     (1.0-4.6)  


 


Absolute Monos (auto)     (0.0-1.3)  


 


Lymphocytes %     (24.0-44.0)  %


 


Monocytes %     (0.0-12.0)  %


 


Eosinophils %     (0.00-5.0)  %


 


Basophils %     (0.0-0.4)  %


 


Absolute Granulocytes     (1.4-6.9)  


 


Basophils #     (0-0.4)  


 


ESR     (0-15)  mm/hr


 


Sodium     (137-145)  mmol/L


 


Potassium     (3.5-5.1)  mmol/L


 


Chloride     ()  mmol/L


 


Carbon Dioxide     (22-30)  mmol/L


 


Anion Gap     (5-15)  MEQ/L


 


BUN     (9-20)  mg/dL


 


Creatinine     (0.66-1.25)  mg/dL


 


Estimated GFR     ML/MIN


 


Glucose     ()  mg/dL


 


POC Glucometer  268 H   318 H  (74 to 106)  mg/dL


 


Hemoglobin A1c   9.37 H   (4.5-6.0)  %


 


Calcium     (8.4-10.2)  mg/dL


 


Total Bilirubin     (0.2-1.3)  mg/dL


 


AST     (17-59)  U/L


 


ALT     (0-50)  U/L


 


Alkaline Phosphatase     ()  U/L


 


Serum Total Protein     (6.3-8.2)  g/dL


 


Albumin     (3.5-5.0)  g/dL


 


SARS-CoV-2 (PCR)     (NEGATIVE)  














  07/09/21 07/09/21 Range/Units





  07:27 11:31 


 


WBC    (4.0-10.5)  K/mm3


 


RBC    (4.1-5.6)  M/mm3


 


Hgb    (12.5-18.0)  gm/dl


 


Hct    (42-50)  %


 


MCV    ()  fl


 


MCH    (26-32)  pg


 


MCHC    (32-36)  g/dl


 


RDW    (11.5-14.0)  %


 


Plt Count    (150-450)  K/mm3


 


MPV    (7.5-11.0)  fl


 


Gran %    (36.0-66.0)  %


 


Eos # (Auto)    (0-0.5)  


 


Absolute Lymphs (auto)    (1.0-4.6)  


 


Absolute Monos (auto)    (0.0-1.3)  


 


Lymphocytes %    (24.0-44.0)  %


 


Monocytes %    (0.0-12.0)  %


 


Eosinophils %    (0.00-5.0)  %


 


Basophils %    (0.0-0.4)  %


 


Absolute Granulocytes    (1.4-6.9)  


 


Basophils #    (0-0.4)  


 


ESR    (0-15)  mm/hr


 


Sodium    (137-145)  mmol/L


 


Potassium    (3.5-5.1)  mmol/L


 


Chloride    ()  mmol/L


 


Carbon Dioxide    (22-30)  mmol/L


 


Anion Gap    (5-15)  MEQ/L


 


BUN    (9-20)  mg/dL


 


Creatinine    (0.66-1.25)  mg/dL


 


Estimated GFR    ML/MIN


 


Glucose    ()  mg/dL


 


POC Glucometer  174 H  245 H  (74 to 106)  mg/dL


 


Hemoglobin A1c    (4.5-6.0)  %


 


Calcium    (8.4-10.2)  mg/dL


 


Total Bilirubin    (0.2-1.3)  mg/dL


 


AST    (17-59)  U/L


 


ALT    (0-50)  U/L


 


Alkaline Phosphatase    ()  U/L


 


Serum Total Protein    (6.3-8.2)  g/dL


 


Albumin    (3.5-5.0)  g/dL


 


SARS-CoV-2 (PCR)    (NEGATIVE)  








                                   Accuchecks











Date                           07/08/21

















Assessment/Plan


(1) Diabetic foot ulcer associated with type 2 diabetes mellitus


Current Visit: No   Status: Acute   


Assessment & Plan: 


will f/u with Dr Hopson next week


Code(s): E11.621 - TYPE 2 DIABETES MELLITUS WITH FOOT ULCER; L97.509 - NON-

PRESSURE CHRONIC ULCER OTH PRT UNSP FOOT W UNSP SEVERITY   





(2) Type II diabetes mellitus, uncontrolled


Current Visit: Yes   Status: Acute   


Assessment & Plan: 


a1c 9.3% needs to see Dr Nguyen endocrinology due to poor glucose control, 

discussed importance with wound healing


Code(s): E11.65 - TYPE 2 DIABETES MELLITUS WITH HYPERGLYCEMIA   





(3) Chronic kidney disease (CKD)


Current Visit: Yes   Status: Acute   


Assessment & Plan: 


on dialysis


Code(s): N18.9 - CHRONIC KIDNEY DISEASE, UNSPECIFIED   





Hospital Summary





- Vitals & Intake/Output


Vital Signs: 


                                   Vital Signs











Temperature  99.5 F   07/09/21 08:00


 


Pulse Rate  83   07/09/21 08:00


 


Respiratory Rate  18   07/09/21 08:00


 


Blood Pressure  130/59   07/09/21 08:00


 


O2 Sat by Pulse Oximetry  91 L  07/09/21 08:00











Intake & Output: 


                                 Intake & Output











 07/07/21 07/08/21 07/09/21 07/10/21





 11:59 11:59 11:59 11:59


 


Intake Total   780 


 


Balance   780 


 


Weight   137.7 kg 














- Lab


Result Diagrams: 


                                 07/08/21 16:10





                                 07/08/21 16:10


Lab Results-Last 24 Hrs: 


                            Lab Results-Last 24 Hours











  07/08/21 07/08/21 07/08/21 Range/Units





  15:51 16:10 16:10 


 


WBC   14.3 H   (4.0-10.5)  K/mm3


 


RBC   3.51 L   (4.1-5.6)  M/mm3


 


Hgb   10.6 L   (12.5-18.0)  gm/dl


 


Hct   34.3 L   (42-50)  %


 


MCV   97.7   ()  fl


 


MCH   30.2   (26-32)  pg


 


MCHC   30.9 L   (32-36)  g/dl


 


RDW   15.8 H   (11.5-14.0)  %


 


Plt Count   331   (150-450)  K/mm3


 


MPV   8.3   (7.5-11.0)  fl


 


Gran %   81.8 H   (36.0-66.0)  %


 


Eos # (Auto)   0.28   (0-0.5)  


 


Absolute Lymphs (auto)   1.08   (1.0-4.6)  


 


Absolute Monos (auto)   1.20   (0.0-1.3)  


 


Lymphocytes %   7.5 L   (24.0-44.0)  %


 


Monocytes %   8.4   (0.0-12.0)  %


 


Eosinophils %   2.0   (0.00-5.0)  %


 


Basophils %   0.3   (0.0-0.4)  %


 


Absolute Granulocytes   11.71 H   (1.4-6.9)  


 


Basophils #   0.04   (0-0.4)  


 


ESR   129 H   (0-15)  mm/hr


 


Sodium    132 L  (137-145)  mmol/L


 


Potassium    5.1  (3.5-5.1)  mmol/L


 


Chloride    87 L  ()  mmol/L


 


Carbon Dioxide    31 H  (22-30)  mmol/L


 


Anion Gap    18.7 H  (5-15)  MEQ/L


 


BUN    32 H  (9-20)  mg/dL


 


Creatinine    5.73 H  (0.66-1.25)  mg/dL


 


Estimated GFR    11.2  ML/MIN


 


Glucose    310 H  ()  mg/dL


 


POC Glucometer     (74 to 106)  mg/dL


 


Hemoglobin A1c     (4.5-6.0)  %


 


Calcium    8.7  (8.4-10.2)  mg/dL


 


Total Bilirubin    0.90  (0.2-1.3)  mg/dL


 


AST    29  (17-59)  U/L


 


ALT    28  (0-50)  U/L


 


Alkaline Phosphatase    126  ()  U/L


 


Serum Total Protein    8.0  (6.3-8.2)  g/dL


 


Albumin    4.1  (3.5-5.0)  g/dL


 


SARS-CoV-2 (PCR)  NEGATIVE    (NEGATIVE)  














  07/08/21 07/08/21 07/08/21 Range/Units





  16:33 16:50 20:57 


 


WBC     (4.0-10.5)  K/mm3


 


RBC     (4.1-5.6)  M/mm3


 


Hgb     (12.5-18.0)  gm/dl


 


Hct     (42-50)  %


 


MCV     ()  fl


 


MCH     (26-32)  pg


 


MCHC     (32-36)  g/dl


 


RDW     (11.5-14.0)  %


 


Plt Count     (150-450)  K/mm3


 


MPV     (7.5-11.0)  fl


 


Gran %     (36.0-66.0)  %


 


Eos # (Auto)     (0-0.5)  


 


Absolute Lymphs (auto)     (1.0-4.6)  


 


Absolute Monos (auto)     (0.0-1.3)  


 


Lymphocytes %     (24.0-44.0)  %


 


Monocytes %     (0.0-12.0)  %


 


Eosinophils %     (0.00-5.0)  %


 


Basophils %     (0.0-0.4)  %


 


Absolute Granulocytes     (1.4-6.9)  


 


Basophils #     (0-0.4)  


 


ESR     (0-15)  mm/hr


 


Sodium     (137-145)  mmol/L


 


Potassium     (3.5-5.1)  mmol/L


 


Chloride     ()  mmol/L


 


Carbon Dioxide     (22-30)  mmol/L


 


Anion Gap     (5-15)  MEQ/L


 


BUN     (9-20)  mg/dL


 


Creatinine     (0.66-1.25)  mg/dL


 


Estimated GFR     ML/MIN


 


Glucose     ()  mg/dL


 


POC Glucometer  268 H   318 H  (74 to 106)  mg/dL


 


Hemoglobin A1c   9.37 H   (4.5-6.0)  %


 


Calcium     (8.4-10.2)  mg/dL


 


Total Bilirubin     (0.2-1.3)  mg/dL


 


AST     (17-59)  U/L


 


ALT     (0-50)  U/L


 


Alkaline Phosphatase     ()  U/L


 


Serum Total Protein     (6.3-8.2)  g/dL


 


Albumin     (3.5-5.0)  g/dL


 


SARS-CoV-2 (PCR)     (NEGATIVE)  














  07/09/21 07/09/21 Range/Units





  07:27 11:31 


 


WBC    (4.0-10.5)  K/mm3


 


RBC    (4.1-5.6)  M/mm3


 


Hgb    (12.5-18.0)  gm/dl


 


Hct    (42-50)  %


 


MCV    ()  fl


 


MCH    (26-32)  pg


 


MCHC    (32-36)  g/dl


 


RDW    (11.5-14.0)  %


 


Plt Count    (150-450)  K/mm3


 


MPV    (7.5-11.0)  fl


 


Gran %    (36.0-66.0)  %


 


Eos # (Auto)    (0-0.5)  


 


Absolute Lymphs (auto)    (1.0-4.6)  


 


Absolute Monos (auto)    (0.0-1.3)  


 


Lymphocytes %    (24.0-44.0)  %


 


Monocytes %    (0.0-12.0)  %


 


Eosinophils %    (0.00-5.0)  %


 


Basophils %    (0.0-0.4)  %


 


Absolute Granulocytes    (1.4-6.9)  


 


Basophils #    (0-0.4)  


 


ESR    (0-15)  mm/hr


 


Sodium    (137-145)  mmol/L


 


Potassium    (3.5-5.1)  mmol/L


 


Chloride    ()  mmol/L


 


Carbon Dioxide    (22-30)  mmol/L


 


Anion Gap    (5-15)  MEQ/L


 


BUN    (9-20)  mg/dL


 


Creatinine    (0.66-1.25)  mg/dL


 


Estimated GFR    ML/MIN


 


Glucose    ()  mg/dL


 


POC Glucometer  174 H  245 H  (74 to 106)  mg/dL


 


Hemoglobin A1c    (4.5-6.0)  %


 


Calcium    (8.4-10.2)  mg/dL


 


Total Bilirubin    (0.2-1.3)  mg/dL


 


AST    (17-59)  U/L


 


ALT    (0-50)  U/L


 


Alkaline Phosphatase    ()  U/L


 


Serum Total Protein    (6.3-8.2)  g/dL


 


Albumin    (3.5-5.0)  g/dL


 


SARS-CoV-2 (PCR)    (NEGATIVE)  











Micro Results-Entire Visit: 


                                   Accuchecks











Date                           07/08/21

















- Procedures and Test


Procedures and Tests throughout Hospitalization: 


                            Therapy Orders & Screens





07/08/21 15:45


OT Eval and Treat (MD Order) ROUTINE 


   Comment: 


   Consulting Provider: 


   Physician Instructions: 


   Reason For Exam: 


PT Eval & Treat (MD Order) ONCE 


   Reason for Eval:: WOUND CARE


   Diagnosis: DIABETIC FOOT ULCER














- Discharge


Disposition: Home, Self-Care


Condition: Stable


Prescriptions: 


Continue


   Gabapentin 600 mg PO BID


   Clonidine HCl 0.1 mg PO BID


   Labetalol HCl [Trandate] 200 mg PO BID


   Insulin Lispro [Humalog] 0 unit SQ UD


   PANTOPRAZOLE 40 mg Tablet*** [Protonix 40MG Tablet***] 40 mg PO DAILY


   Lisinopril 40 mg PO BID


   Insulin Detemir [Levemir] 60 unit SQ HS


   Calcitriol 0.5 mcg PO BID


Follow up with: 


RENE LOZA MD [Primary Care Provider] -

## 2022-04-13 ENCOUNTER — HOSPITAL ENCOUNTER (EMERGENCY)
Dept: HOSPITAL 33 - ED | Age: 52
Discharge: HOME | End: 2022-04-13
Payer: MEDICARE

## 2022-04-13 VITALS — SYSTOLIC BLOOD PRESSURE: 167 MMHG | DIASTOLIC BLOOD PRESSURE: 85 MMHG

## 2022-04-13 VITALS — OXYGEN SATURATION: 96 %

## 2022-04-13 VITALS — HEART RATE: 70 BPM

## 2022-04-13 DIAGNOSIS — Z79.899: ICD-10-CM

## 2022-04-13 DIAGNOSIS — Z79.4: ICD-10-CM

## 2022-04-13 DIAGNOSIS — R05.9: ICD-10-CM

## 2022-04-13 DIAGNOSIS — Z99.2: ICD-10-CM

## 2022-04-13 DIAGNOSIS — I12.0: ICD-10-CM

## 2022-04-13 DIAGNOSIS — E11.42: ICD-10-CM

## 2022-04-13 DIAGNOSIS — Z79.891: ICD-10-CM

## 2022-04-13 DIAGNOSIS — E11.22: ICD-10-CM

## 2022-04-13 DIAGNOSIS — R06.02: ICD-10-CM

## 2022-04-13 DIAGNOSIS — R91.8: Primary | ICD-10-CM

## 2022-04-13 DIAGNOSIS — N18.5: ICD-10-CM

## 2022-04-13 LAB
FLUAV AG NPH QL IA: NEGATIVE
FLUBV AG NPH QL IA: NEGATIVE
RSV AG SPEC QL IA: NEGATIVE
SARS-COV-2 AG RESP QL IA.RAPID: NEGATIVE

## 2022-04-13 PROCEDURE — 0241U: CPT

## 2022-04-13 PROCEDURE — 96372 THER/PROPH/DIAG INJ SC/IM: CPT

## 2022-04-13 PROCEDURE — 87651 STREP A DNA AMP PROBE: CPT

## 2022-04-13 PROCEDURE — 96374 THER/PROPH/DIAG INJ IV PUSH: CPT

## 2022-04-13 PROCEDURE — 71045 X-RAY EXAM CHEST 1 VIEW: CPT

## 2022-04-13 PROCEDURE — 94760 N-INVAS EAR/PLS OXIMETRY 1: CPT

## 2022-04-13 PROCEDURE — 99284 EMERGENCY DEPT VISIT MOD MDM: CPT

## 2022-04-13 RX ADMIN — CEFTRIAXONE STA: 1 INJECTION, SOLUTION INTRAVENOUS at 15:59

## 2022-04-13 RX ADMIN — CEFTRIAXONE STA MLS/HR: 1 INJECTION, SOLUTION INTRAVENOUS at 15:36

## 2022-04-13 NOTE — ERPHSYRPT
- History of Present Illness


Time Seen by Provider: 04/13/22 13:49


Source: patient, EMS


Exam Limitations: no limitations


Physician History: 





This is a 52-year-old white male patient of Dr. Styles who has renal failure and 

is on Monday Wednesday Friday dialysis.  He also has diabetes, hypertension, 

obesity and is bilateral below the knee amputee.  Patient was seen by Dr. Styles 

2 days ago and was treated for seasonal allergies and possible bacterial 

bronchitis and started on amoxicillin.  He states that he is not improved.  He 

was coughing during dialysis today and they brought him over to the emergency 

department for further evaluation.  He has had a productive cough of yellowish-

brown sputum.  He has not had a fever.  He denies chest pain.  He has mild 

shortness of breath and he has no abdominal pain.  He denies nausea vomiting or 

diarrhea.


Timing/Duration: day(s) (2)


Activities at Onset: none


Severity of Dyspnea-Max: mild


Severity of Dyspnea-Current: mild


Possible Cause: occasional episodes


Modifying Factors: Improves With: activity, coughing


Associated Symptoms: cough


Allergies/Adverse Reactions: 








No Known Drug Allergies Allergy (Verified 04/13/22 13:51)


   





Home Medications: 








Gabapentin 600 mg PO BID 04/17/19 [History]


cloNIDine HCL [Clonidine HCl] 0.1 mg PO BID 04/17/19 [History]


Insulin Lispro [Humalog] 0 unit SQ UD 10/09/19 [History]


Labetalol HCl [Trandate] 200 mg PO BID 10/09/19 [History]


Insulin Detemir [Levemir] 60 unit SQ HS 07/08/21 [History]


PANTOPRAZOLE 40 mg Tablet*** [Protonix 40MG Tablet***] 40 mg PO DAILY 07/08/21 

[History]


calcitrioL [Calcitriol] 0.5 mcg PO BID 07/08/21 [History]


lisinopriL [Lisinopril] 40 mg PO BID 07/08/21 [History]





Hx Tetanus, Diphtheria Vaccination/Date Given: Yes


Hx Influenza Vaccination/Date Given: Yes


Hx Pneumococcal Vaccination/Date Given: No





Travel Risk





- International Travel


Have you traveled outside of the country in past 3 weeks: No





- Coronavirus Screening


Are you exhibiting any of the following symptoms?: No


Close contact with a COVID-19 positive Pt in past 14-21 Days: No





- Vaccine Status


Have you recieved a Covid-19 vaccination: Yes


: Moderna





- Vaccination Dates


Date of 2cond Vaccination (if applicable): April 2021





- Review of Systems


Constitutional: No Symptoms


Eyes: No Symptoms


Ears, Nose, & Throat: No Symptoms


Respiratory: Cough


Cardiac: No Symptoms


Abdominal/Gastrointestinal: No Symptoms


Genitourinary Symptoms: No Symptoms


Musculoskeletal: No Symptoms


Skin: No Symptoms


Neurological: No Symptoms


Psychological: No Symptoms


Endocrine: No Symptoms


Hematologic/Lymphatic: No Symptoms


Immunological/Allergic: No Symptoms


All Other Systems: Reviewed and Negative





- Past Medical History


Pertinent Past Medical History: Yes


Neurological History: Peripheral Neuropathy


ENT History: No Pertinent History


Cardiac History: Hypertension


Respiratory History: No Pertinent History


Endocrine Medical History: Diabetes Type II, Other


Musculoskeletal History: Other


GI Medical History: No Pertinent History


 History: Dialysis, Renal Disease, Other


Psycho-Social History: No Pertinent History


Male Reproductive Disorders: No Pertinent History


Other Medical History: CKD STAGE 5 WITH DIALYSIS , R BKA





- Past Surgical History


Past Surgical History: Yes


Neuro Surgical History: No Pertinent History


Cardiac: No Pertinent History


Respiratory: No Pertinent History


Gastrointestinal: No Pertinent History


Genitourinary: No Pertinent History


Musculoskeletal: Amputation


Male Surgical History: No Pertinent History


Other Surgical History: permacath placed right chest (removed).  may 2019 right 

below the knee amputation.  AV fistula Left upper arm.  L little toe removed 5 

yr ago





- Social History


Smoking Status: Never smoker


Exposure to second hand smoke: Yes


Alcohol Use: None


Drug Use: none


Patient Lives Alone: No


Significant Family History: no pertinent family hx





- Nursing Vital Signs


Nursing Vital Signs: 


                               Initial Vital Signs











Temperature  97.8 F   04/13/22 13:52


 


Pulse Rate  70   04/13/22 13:52


 


Respiratory Rate  18   04/13/22 13:52


 


Blood Pressure  119/64   04/13/22 13:52


 


O2 Sat by Pulse Oximetry  97   04/13/22 13:52








                                   Pain Scale











Pain Intensity                 5

















- Physical Exam


General Appearance: no apparent distress, alert, anxiety


Eye Exam: PERRL/EOMI, eyes nml inspection


Ears, Nose, Throat Exam: hearing grossly normal, normal ENT inspection


Neck Exam: normal inspection, non-tender, supple, full range of motion


Respiratory Exam: normal breath sounds, lungs clear, airway intact, No chest 

tenderness, No respiratory distress


Cardiovascular/Chest Exam: normal heart sounds, regular rate/rhythm


Abdominal/Gastrointestinal Exam: soft, normal bowel sounds, No tenderness


Rectal Exam: not done


Extremity Exam: non-tender (Patient is bilateral below the knee amputee), normal

 range of motion, normal inspection


Neurologic Exam: alert, oriented x 3, cooperative, CNs II-XII nml as tested, 

normal mood/affect


Skin Exam: normal color, warm, dry


Lymphatic Exam: No adenopathy


**SpO2 Interpretation**: normal


O2 Delivery: Room Air





- Course


Nursing assessment & vital signs reviewed: Yes


Ordered Tests: 


                               Active Orders 24 hr











 Category Date Time Status


 


 Pulse Oximetry (ED) STAT Care  04/13/22 14:39 Active


 


 CHEST 1 VIEW (PORTABLE) Stat Exams  04/13/22 14:40 Completed








Medication Summary











Generic Name Dose Route Start Last Admin





  Trade Name Freq  PRN Reason Stop Dose Admin


 


Ceftriaxone Sodium/Dextrose  1 g in 50 mls @ 100 mls/hr  04/13/22 15:30  

04/13/22 15:36





  Rocephin 1 Gm-D5w 50 Ml Bag**  IV  04/13/22 15:59  100 mls/hr





  STAT STA   100 mls/hr





    Administration














Discontinued Medications














Generic Name Dose Route Start Last Admin





  Trade Name Freq  PRN Reason Stop Dose Admin


 


Hydrocodone Bitart/Acetaminophen  10 ml  04/13/22 14:41  04/13/22 14:57





  Hydrocodone/Acetaminophen 5 Ml Udcup  PO  04/13/22 14:42  10 ml





  STAT STA   Administration


 


Hydrocodone Bitart/Acetaminophen  Confirm  04/13/22 14:56 





  Hydrocodone/Acetaminophen 5 Ml Udcup  Administered  04/13/22 14:57 





  Dose  





  10 ml  





  .ROUTE  





  .STK-MED ONE  


 


Methylprednisolone Sodium  0 mg  04/13/22 15:31  04/13/22 15:35





Succinate 125 mg/ Sterile  IV  04/13/22 15:32  125 mg





Water 2 ml  STAT ONE   Administration


 


Ceftriaxone Sodium/Dextrose  Confirm  04/13/22 15:34 





  Rocephin 1 Gm-D5w 50 Ml Bag**  Administered  04/13/22 15:35 





  Dose  





  1 g in 50 mls @ ud  





  IV  





  .STK-MED ONE  


 


Methylprednisolone Sodium Succinate  Confirm  04/13/22 15:34 





  Methylprednis Sod Succ 125 Mg/2 Ml Vial***  Administered  04/13/22 15:35 





  Dose  





  125 mg  





  .ROUTE  





  .STK-MED ONE  


 


Sterile Water  Confirm  04/13/22 15:34 





  Water For Injection,Sterile 10 Ml Vial  Administered  04/13/22 15:35 





  Dose  





  10 ml  





  IJ  





  .STK-MED ONE  











Lab/Rad Data: 


                               Laboratory Results











  04/13/22 04/13/22 Range/Units





  14:48 14:48 


 


Influenza Type A Ag  NEGATIVE   (NEGATIVE)  


 


Influenza Type B Ag  NEGATIVE   (NEGATIVE)  


 


RSV (PCR)  NEGATIVE   (Negative)  


 


SARS-CoV-2 (PCR)  NEGATIVE   (NEGATIVE)  


 


Group A Strep Antibody   NOT DETECTED  (NEGATIVE)  














- Progress


Progress: improved, re-examined


Air Movement: good


Progress Note: 





04/13/22 15:32


Chest x-ray shows new hazy left base opacity


Antibiotics given: Yes


Counseled pt/family regarding: lab results, diagnosis, need for follow-up, rad r

esults





- Departure


Departure Disposition: Home


Clinical Impression: 


 Left pulmonary infiltrate on CXR





Condition: Stable


Critical Care Time: No


Referrals: 


RENE LOZA MD [COURTESY STAFF] - Follow up/PCP as directed


Additional Instructions: 


Drink plenty of fluids.  Take your medication as prescribed.  Follow-up with 

your primary care physician for further management.


Prescriptions: 


Hydrocodone/Acetaminophen [Hydrocodone-Acetamn 7.5-325/15] 10 ml PO Q8H PRN PRN 

#120 ml MDD 30 ml


 PRN Reason: Cough


Albuterol 8 gm Mdi Hfa*** [Ventolin Hfa MDI***] 8 gm IH Q4H #1 unit


Azithromycin 250 mg*** [Zithromax 250 MG TABLET***] 250 mg PO ZPACK #6 tablet

## 2022-04-13 NOTE — XRAY
Indication: Cough.



Comparison: August 2, 2019.



Portable chest demonstrates new hazy left base interstitial alveolar opacities

without consolidation/large effusion.  Remaining heart and right lung

unremarkable.  Bony thorax intact.

## 2023-10-30 ENCOUNTER — HOSPITAL ENCOUNTER (EMERGENCY)
Dept: HOSPITAL 33 - ED | Age: 53
Discharge: HOME | End: 2023-10-30
Payer: MEDICARE

## 2023-10-30 VITALS — TEMPERATURE: 97.9 F | HEART RATE: 66 BPM

## 2023-10-30 VITALS
OXYGEN SATURATION: 92 % | DIASTOLIC BLOOD PRESSURE: 89 MMHG | SYSTOLIC BLOOD PRESSURE: 142 MMHG | RESPIRATION RATE: 19 BRPM

## 2023-10-30 DIAGNOSIS — N18.6: ICD-10-CM

## 2023-10-30 DIAGNOSIS — E11.22: ICD-10-CM

## 2023-10-30 DIAGNOSIS — Z99.81: ICD-10-CM

## 2023-10-30 DIAGNOSIS — Z79.4: ICD-10-CM

## 2023-10-30 DIAGNOSIS — Z89.511: ICD-10-CM

## 2023-10-30 DIAGNOSIS — Z99.2: ICD-10-CM
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
## 2023-10-30 DIAGNOSIS — Z79.899: ICD-10-CM

## 2023-10-30 DIAGNOSIS — I12.0: ICD-10-CM

## 2023-10-30 DIAGNOSIS — Z79.891: ICD-10-CM

## 2023-10-30 DIAGNOSIS — Z89.512: ICD-10-CM

## 2023-10-30 DIAGNOSIS — E11.42: ICD-10-CM
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
## 2023-10-30 DIAGNOSIS — S20.211A: Primary | ICD-10-CM

## 2023-10-30 PROCEDURE — 71045 X-RAY EXAM CHEST 1 VIEW: CPT

## 2023-10-30 PROCEDURE — 99283 EMERGENCY DEPT VISIT LOW MDM: CPT

## 2023-10-30 PROCEDURE — 71100 X-RAY EXAM RIBS UNI 2 VIEWS: CPT

## 2023-10-30 NOTE — XRAY
Indication: Pain following fall 4 days ago.



Comparison: None



2 portable views right ribs demonstrate tiny right lung base calcified

granuloma and mild multilevel thoracic degenerative spondylosis.  No other

bony, articular, or soft tissue abnormalities.

## 2023-10-30 NOTE — XRAY
Indication: Right axilla/rib pain following fall 4 days ago.  Cough.



Comparison: April 13, 2022



Portable chest less inflated without focal infiltrate, consolidation, large

effusion, or pneumothorax.  Heart not enlarged.  Bony thorax intact.



Impression: Nonacute chest.

## 2023-10-30 NOTE — ERPHSYRPT
- History of Present Illness


Time Seen by Provider: 10/30/23 12:02


Source: patient


Exam Limitations: no limitations


Physician History: 





This is a morbidly obese 53-year-old white male patient of Dr. Harding who fell 

out of bed 4 days ago onto his right chest.  Patient states that hurts to take a

deep breath.  He has had pain in the last 4 days but it is worsening.  There is 

some bruising present as well.  Patient has bilateral below the knee 

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 

history of hypertension and chronic renal failure with Monday Wednesday Friday 

dialysis.  Because of the pain in his right lateral chest, the patient left 

dialysis early today.  Patient wears 3 L of oxygen via nasal cannula


Occurred: days ago (4)


Reason for Fall: fell from height (Fell out of bed)


Injuries/Pain Location: chest (Right lateral)


Loss of Consciousness: no loss of consciousness


Quality: sharpness, stabbing


Severity of Pain-Max: moderate


Severity of Pain-Current: moderate


Modifying Factors: Improves With: other (Deep breath hurts)


Associated Symptoms (Fall): chest pain (Right lateral rib pain)


Allergies/Adverse Reactions: 








No Known Drug Allergies Allergy (Verified 10/30/23 12:13)


   





Home Medications: 








Gabapentin 600 mg PO BID 04/17/19 [History]


cloNIDine HCL [Clonidine HCl] 0.1 mg PO BID 04/17/19 [History]


Insulin Lispro [Humalog] 0 unit SQ UD 10/09/19 [History]


Labetalol HCl [Trandate] 200 mg PO BID 10/09/19 [History]


Insulin Detemir [Levemir] 60 unit SQ HS 07/08/21 [History]


PANTOPRAZOLE 40 mg Tablet*** [Protonix 40MG Tablet***] 40 mg PO DAILY 07/08/21 

[History]


calcitrioL [Calcitriol] 0.5 mcg PO BID 07/08/21 [History]


lisinopriL [Lisinopril] 40 mg PO BID 07/08/21 [History]





Hx Tetanus, Diphtheria Vaccination/Date Given: Yes


Hx Influenza Vaccination/Date Given: Yes


Hx Pneumococcal Vaccination/Date Given: No





Travel Risk





- International Travel


Have you traveled outside of the country in past 3 weeks: No





- Coronavirus Screening


Are you exhibiting any of the following symptoms?: No


Close contact with a COVID-19 positive Pt in past 14-21 Days: No





- Vaccine Status


Have you recieved a Covid-19 vaccination: Yes


: Moderna





- Vaccination Dates


Date of 2cond Vaccination (if applicable): April 2021





- Review of Systems


Constitutional: No Symptoms


Eyes: No Symptoms


Ears, Nose, & Throat: No Symptoms


Respiratory: No Symptoms


Cardiac: No Symptoms


Abdominal/Gastrointestinal: No Symptoms


Genitourinary Symptoms: No Symptoms


Musculoskeletal: Fall, Injury (Right lateral ribs)


Skin: No Symptoms


Neurological: No Symptoms


Psychological: No Symptoms


Endocrine: No Symptoms


Hematologic/Lymphatic: No Symptoms


Immunological/Allergic: No Symptoms


All Other Systems: Reviewed and Negative





- Past Medical History


Pertinent Past Medical History: Yes


Neurological History: Peripheral Neuropathy, Stroke


ENT History: No Pertinent History


Cardiac History: Hypertension


Respiratory History: Asthma


Endocrine Medical History: Diabetes Type II, Hypothyroidism


Musculoskeletal History: Arthritis


GI Medical History: No Pertinent History


 History: Dialysis, Renal Disease, Other


Psycho-Social History: No Pertinent History


Male Reproductive Disorders: No Pertinent History


Other Medical History: DIALYSIS 3X/WEEK (M-W-F IN THE MORNINGS 8AM-12:30PM).  

BILATERAL BKA WITH PROSTHESIS





- Past Surgical History


Past Surgical History: Yes


Neuro Surgical History: No Pertinent History


Cardiac: No Pertinent History


Respiratory: No Pertinent History


Gastrointestinal: No Pertinent History


Genitourinary: No Pertinent History


Musculoskeletal: Amputation


Male Surgical History: No Pertinent History


Other Surgical History: permacath placed right chest (removed).  may 2019 right 

below the knee amputation.  AV fistula Left upper arm.  L little toe removed 5 

yr ago





- Social History


Smoking Status: Never smoker


Exposure to second hand smoke: Yes


Alcohol Use: None


Drug Use: none


Patient Lives Alone: No


Significant Family History: no pertinent family hx





- Nursing Vital Signs


Nursing Vital Signs: 


                               Initial Vital Signs











Temperature  97.9 F   10/30/23 12:04


 


Pulse Rate  66   10/30/23 12:04


 


Respiratory Rate  17   10/30/23 12:04


 


Blood Pressure  146/56   10/30/23 12:04


 


O2 Sat by Pulse Oximetry  89 L  10/30/23 12:04








                                   Pain Scale











Pain Intensity                 10

















- [place] Coma Score


Best Eye Response (Manju): (4) open spontaneously


Best Verbal Response (Manju): (5) oriented


Best Motor Response (Manju): (6) obeys commands


[place]ju Total: 15





- Physical Exam


General Appearance: no apparent distress, alert, anxiety, obese


Head Injury: no evidence of injury


Eye Exam: PERRL/EOMI, eyes nml inspection


ENT Exam: airway nml, nml ext.inspection, No dental injury


Neck Exam: supple, trachea midline, full range of motion, normal alignment


Respiratory/Chest Exam: ecchymosis (Skin overlying right lateral ribs), rib 

tenderness (Right lateral ribs), No crepitus


Cardiovascular Exam: normal heart sounds, regular rate/rhythm


Gastrointestinal Exam: No tenderness


Rectal Exam: not done


Back Exam: normal inspection, normal range of motion, No CVA tenderness


Extremity Exam: other (Patient with bilateral below the knee amputations)


Neurologic Exam: alert, oriented x 3, cooperative, CNs II-XII nml as tested, 

normal mood/affect, nml cerebellar function, nml station & gait, sensation nml


Skin Exam: normal color, warm, dry


**SpO2 Interpretation**: normal


O2 Delivery: Room Air





- Course


Nursing assessment & vital signs reviewed: Yes


Ordered Tests: 


                               Active Orders 24 hr











 Category Date Time Status


 


 CHEST 1 VIEW (PORTABLE) Stat Exams  10/30/23 12:22 Taken


 


 RIBS UNILATERAL Stat Exams  10/30/23 12:22 Completed








Medication Summary














Discontinued Medications














Generic Name Dose Route Start Last Admin





  Trade Name Freq  PRN Reason Stop Dose Admin


 


Orphenadrine Citrate  100 mg  10/30/23 12:50  10/30/23 12:53





  Orphenadrine Citrate 100 Mg Er Tab  PO  10/30/23 12:51  100 mg





  STAT ONE   Administration


 


Orphenadrine Citrate  Confirm  10/30/23 12:52 





  Orphenadrine Citrate 100 Mg Er Tab  Administered  10/30/23 12:53 





  Dose  





  100 mg  





  PO  





  .STK-MED ONE  


 


Oxycodone/Acetaminophen  1 tab  10/30/23 12:50  10/30/23 12:53





  Oxycodone Hcl/Apap 5 Mg/325 Mg Tablet  PO  10/30/23 12:51  1 tab





  STAT STA   Administration


 


Oxycodone/Acetaminophen  Confirm  10/30/23 12:52 





  Oxycodone Hcl/Apap 5 Mg/325 Mg Tablet  Administered  10/30/23 12:53 





  Dose  





  1 tab  





  .ROUTE  





  .STK-MED ONE  














- Progress


Progress: improved, pain not gone completely, re-examined


Progress Note: 





10/30/23 12:47


Patient's medical issue is 1 of low complexity.  The level of complexity in the 

work-up performed is based on review of the patient's past medical history, 

review of the patient's medication list, review the patient's drug allergy list,

 history of present illness and physical findings on examination.  This 

patient's work-up includes chest x-ray and x-ray of right ribs.


10/30/23 13:08


Chest x-ray was interpreted by the radiologist.  Impression states no acute 

cardiopulmonary process.  Bony thorax intact.  I reviewed the impression.


Counseled pt/family regarding: diagnosis, need for follow-up, rad results





Medical Desision Making





- Independent Historian


Additional History obtained from: Spouse





- Diagnostic Testing


Diagnostic test were ordered, analyzed, and reviewed by me: Yes


Radiological Interpretation: Reviewed by me, Teleradiologist Report





- Risk of complications


The pt has a mod risk of morbidity or mortality based on: Need for prescription 

drug management





- Departure


Departure Disposition: Home


Clinical Impression: 


 Rib contusion





Condition: Stable


Critical Care Time: No


Referrals: 


FRANCK HARDING MD [Primary Care Provider] - Follow up/PCP as directed


Additional Instructions: 


Take your medication as prescribed.  Ice pack to the area 2-3 times a day for 

the next 48 hours.  Follow-up with your primary care provider today to make 

arranges for follow-up appointment in the next 2 to 3 days for further 

evaluation management as well as pain control.


Prescriptions: 


Oxycodone HCl/Acetaminophen [Percocet 5-325 mg Tablet] 1 each PO Q8H PRN PRN #6 

tablet MDD 3


 PRN Reason: Moderate To Severe Pain

## 2024-11-05 ENCOUNTER — HOSPITAL ENCOUNTER (EMERGENCY)
Dept: HOSPITAL 33 - ED | Age: 54
Discharge: ANOTHER HOSPITAL | End: 2024-11-05
Payer: MEDICARE

## 2024-11-05 VITALS
SYSTOLIC BLOOD PRESSURE: 112 MMHG | RESPIRATION RATE: 20 BRPM | HEART RATE: 94 BPM | DIASTOLIC BLOOD PRESSURE: 69 MMHG | OXYGEN SATURATION: 95 %

## 2024-11-05 VITALS — TEMPERATURE: 97.8 F

## 2024-11-05 DIAGNOSIS — E78.5: ICD-10-CM
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
## 2024-11-05 DIAGNOSIS — E11.22: ICD-10-CM

## 2024-11-05 DIAGNOSIS — Z79.899: ICD-10-CM
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
## 2024-11-05 DIAGNOSIS — E11.42: ICD-10-CM

## 2024-11-05 DIAGNOSIS — I12.0: ICD-10-CM

## 2024-11-05 DIAGNOSIS — N18.6: ICD-10-CM

## 2024-11-05 DIAGNOSIS — Z99.2: ICD-10-CM

## 2024-11-05 DIAGNOSIS — Z79.02: ICD-10-CM

## 2024-11-05 DIAGNOSIS — S43.014A: Primary | ICD-10-CM

## 2024-11-05 PROCEDURE — 99285 EMERGENCY DEPT VISIT HI MDM: CPT

## 2024-11-05 PROCEDURE — 96375 TX/PRO/DX INJ NEW DRUG ADDON: CPT

## 2024-11-05 PROCEDURE — 96374 THER/PROPH/DIAG INJ IV PUSH: CPT

## 2024-11-05 PROCEDURE — 36000 PLACE NEEDLE IN VEIN: CPT

## 2024-11-05 PROCEDURE — 23650 CLTX SHO DSLC W/MNPJ WO ANES: CPT

## 2024-11-05 PROCEDURE — 99284 EMERGENCY DEPT VISIT MOD MDM: CPT

## 2024-11-05 PROCEDURE — 96376 TX/PRO/DX INJ SAME DRUG ADON: CPT

## 2024-11-05 PROCEDURE — 94799 UNLISTED PULMONARY SVC/PX: CPT

## 2024-11-05 PROCEDURE — 73030 X-RAY EXAM OF SHOULDER: CPT

## 2024-11-05 RX ADMIN — ONDANSETRON ONE MG: 2 INJECTION, SOLUTION INTRAMUSCULAR; INTRAVENOUS at 22:00

## 2024-11-05 RX ADMIN — MORPHINE SULFATE ONE MG: 4 INJECTION, SOLUTION INTRAMUSCULAR; INTRAVENOUS at 22:00

## 2025-04-16 ENCOUNTER — HOSPITAL ENCOUNTER (EMERGENCY)
Dept: HOSPITAL 33 - ED | Age: 55
Discharge: HOME | End: 2025-04-16
Payer: MEDICARE

## 2025-04-16 VITALS — OXYGEN SATURATION: 96 % | HEART RATE: 95 BPM | RESPIRATION RATE: 18 BRPM | TEMPERATURE: 97.2 F

## 2025-04-16 VITALS — SYSTOLIC BLOOD PRESSURE: 117 MMHG | DIASTOLIC BLOOD PRESSURE: 73 MMHG

## 2025-04-16 DIAGNOSIS — S60.032A: Primary | ICD-10-CM

## 2025-04-16 DIAGNOSIS — Z79.02: ICD-10-CM

## 2025-04-16 DIAGNOSIS — E78.5: ICD-10-CM

## 2025-04-16 DIAGNOSIS — Z79.899: ICD-10-CM

## 2025-04-16 DIAGNOSIS — I12.0: ICD-10-CM

## 2025-04-16 DIAGNOSIS — N18.6: ICD-10-CM

## 2025-04-16 DIAGNOSIS — Z99.2: ICD-10-CM
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
## 2025-04-16 LAB
ALBUMIN SERPL-MCNC: 4.7 G/DL (ref 3.5–5)
ANION GAP SERPL CALC-SCNC: 22.8 MEQ/L (ref 5–15)
BASOPHILS # BLD AUTO: 0.09 X10^3/UL (ref 0.01–0.08)
BASOPHILS NFR BLD AUTO: 0.8 % (ref 0.2–1.2)
BILIRUB BLD-MCNC: 0.7 MG/DL (ref 0.2–1.3)
CALCIUM SPEC-MCNC: 8.8 MG/DL (ref 8.4–10.2)
CREAT SERPL-MCNC: 7.07 MG/DL (ref 0.66–1.25)
EOSINOPHIL # BLD AUTO: 0.21 X10^3/UL (ref 0.04–0.54)
GFR SERPLBLD BASED ON 1.73 SQ M-ARVRAT: 8.6 ML/MIN
HCT VFR BLD AUTO: 46.2 % (ref 40.1–51)
IMM GRANULOCYTES # BLD: 0.11 X10^3U/L (ref 0–0.03)
LYMPHOCYTES # SPEC AUTO: 0.82 X10^3/UL (ref 1.32–3.57)
MCHC RBC AUTO-ENTMCNC: 32.5 G/DL (ref 32.3–36.5)
MONOCYTES # BLD AUTO: 0.77 X10^3/UL (ref 0.3–0.82)
PLATELET # BLD AUTO: 194 X10^3/UL (ref 163–337)
POTASSIUM SERPLBLD-SCNC: 4.5 MMOL/L (ref 3.5–5.1)
PROT SERPL-MCNC: 8.4 G/DL (ref 6.3–8.2)
RBC # BLD AUTO: 4.84 X10^6/UL (ref 4.63–6.08)
WBC # BLD AUTO: 10.8 X10^3/UL (ref 4.23–9.07)

## 2025-04-16 PROCEDURE — 36415 COLL VENOUS BLD VENIPUNCTURE: CPT

## 2025-04-16 PROCEDURE — 99284 EMERGENCY DEPT VISIT MOD MDM: CPT

## 2025-04-16 PROCEDURE — 73200 CT UPPER EXTREMITY W/O DYE: CPT

## 2025-04-16 PROCEDURE — 85025 COMPLETE CBC W/AUTO DIFF WBC: CPT

## 2025-04-16 PROCEDURE — 99283 EMERGENCY DEPT VISIT LOW MDM: CPT

## 2025-04-16 PROCEDURE — 80053 COMPREHEN METABOLIC PANEL: CPT
